# Patient Record
Sex: FEMALE | Race: NATIVE HAWAIIAN OR OTHER PACIFIC ISLANDER | Employment: UNEMPLOYED | URBAN - METROPOLITAN AREA
[De-identification: names, ages, dates, MRNs, and addresses within clinical notes are randomized per-mention and may not be internally consistent; named-entity substitution may affect disease eponyms.]

---

## 2021-06-04 ENCOUNTER — HOSPITAL ENCOUNTER (OUTPATIENT)
Dept: MAMMOGRAPHY | Age: 31
Discharge: HOME | End: 2021-06-04
Attending: CLINIC/CENTER
Payer: COMMERCIAL

## 2021-06-04 DIAGNOSIS — Z80.3 FAMILY HISTORY OF BREAST CANCER: ICD-10-CM

## 2021-06-04 DIAGNOSIS — Z71.1 FEARED COMPLAINT WITHOUT DIAGNOSIS: ICD-10-CM

## 2021-06-04 PROCEDURE — 77066 DX MAMMO INCL CAD BI: CPT

## 2021-06-04 PROCEDURE — 76642 ULTRASOUND BREAST LIMITED: CPT

## 2021-06-04 NOTE — PROGRESS NOTES
Notify of no evidence of breast cancer, implants are seen and appear intact, palpable area likely is the implant.   If worsening problems will refer to plastic surgery for additional evaluation of implant

## 2022-06-01 ENCOUNTER — OFFICE VISIT (OUTPATIENT)
Dept: ORTHOPEDIC SURGERY | Age: 32
End: 2022-06-01
Payer: COMMERCIAL

## 2022-06-01 VITALS — WEIGHT: 165 LBS | BODY MASS INDEX: 29.23 KG/M2 | HEIGHT: 63 IN

## 2022-06-01 DIAGNOSIS — M54.16 LUMBAR RADICULOPATHY: ICD-10-CM

## 2022-06-01 DIAGNOSIS — M43.16 SPONDYLOLISTHESIS OF LUMBAR REGION: Primary | ICD-10-CM

## 2022-06-01 PROCEDURE — 99203 OFFICE O/P NEW LOW 30 MIN: CPT | Performed by: PHYSICIAN ASSISTANT

## 2022-06-01 NOTE — LETTER
Danny Bob                                                     KENY DUBON  1990  MRN [id number]                                              ROOM NUMBER______      Radiographic Studies:    Cervical MRI      Thoracic MRI         Lumbar MRI          Pelvis MRI        CONTRAST    CT Myelogram: _______________   NCS/EMG ________________ ( UE  /  LE )     MRI of ___________________          Other: ____________________      Injections:    KNEE    HIP  Depomedrol _____ mg Euflexxa _____    _______________ TFESI/SNRB  _______________ SI Joint  _______________ SCOTT    _______________ Facet  _______________Piriformis/ Sciatica      Medications:    Oral Steroids _______________  NSAIDS _______________    Muscle Relaxers _______________  Neurontin/Lyrica _______________    Pain Medicine _______________  Other _______________                       Physical Therapy:    Lumbar     Thoracic      Cervical     Hip       Knee       Shoulder               Traction          Ultrasound          Dry Needling      Referral:    Pain referral:  [facility]   [facility]   Other: ______________________________    Follow-up/ Refer__________________________________________________    Authorization to hold blood thinners:___________________________________

## 2022-06-01 NOTE — PATIENT INSTRUCTIONS
Adult Spondylolisthesis in the Low Back    In spondylolisthesis, one of the bones in your spine  called a vertebra  slips forward and out of place. This may occur anywhere along the spine, but is most common in the lower back (lumbar spine). In some people, this causes no symptoms at all. Others may have back and leg pain that ranges from mild to severe. Understanding how your spine works can help you better understand spondylolisthesis. Types of Spondylolisthesis  Many types of spondylolisthesis can affect adults. The two most common types are degenerative and spondylolytic. There are other less common types of spondylolisthesis, such as slippage caused by a recent, severe fracture or a tumor. Degenerative Spondylolisthesis  As we age, general wear and tear causes changes in the spine. Intervertebral disks begin to dry out and weaken. They lose height, become stiff, and begin to bulge. This disk degeneration is the start to both arthritis and degenerative spondylolisthesis (DS). Degenerative spondylolisthesis  As arthritis develops, it weakens the joints and ligaments that hold your vertebrae in the proper position. The ligament along the back of your spine (ligamentum flavum) may begin to buckle. One of the vertebrae on either side of a worn, flattened disk can loosen and move forward over the vertebra below it. This slippage can narrow the spinal canal and put pressure on the spinal cord. This narrowing of the spinal canal is called spinal stenosis and is a common problem in patients with DS. Women are more likely than men to have DS, and it is more common in patients who are older than 50. A higher incidence has been noted in the -American population. In this x-ray taken from the side, vertebrae in the low back have slipped out of place due to degenerative spondylolisthesis.       Spondylolytic Spondylolisthesis  One of the bones in your lower back can break and this can cause a vertebra to slip forward. The break most often occurs in the area of your lumbar spine called the pars interarticularis. In most cases of spondylolytic spondylolisthesis, the pars fracture occurs during adolescence and goes unnoticed until adulthood. The normal disk degeneration that occurs in adulthood can then stress the pars fracture and cause the vertebra to slip forward. This type of spondylolisthesis is most often seen in middle-aged men. (Left) In spondylolysis, a fracture often occurs at the pars interarticularis. (Right) Because of the pars fracture, only the front part of the bone slips forward. Because a pars fracture causes the front (vertebra) and back (lamina) parts of the spinal bone to disconnect, only the front part slips forward. This means that narrowing of the spinal canal is less likely than in other kinds of spondylolisthesis, such as DS in which the entire spinal bone slips forward. This x-ray taken from the side shows a pars fracture (arrow) and the resulting spondylolisthesis. Symptoms  About 4% to 6% of the [place] population has spondylolysis and spondylolisthesis. Most of these people live with the condition for many years without any pain or other symptoms. Symptoms of Degenerative Spondylolisthesis  Patients with DS often visit the doctor's office once the slippage has begun to put pressure on the spinal nerves. Although the doctor may find arthritis in the spine, the symptoms of DS are typically the same as symptoms of spinal stenosis. For example, DS patients often develop leg and/or lower back pain. The most common symptoms in the legs include a feeling of vague weakness associated with prolonged standing or walking. Leg symptoms may be accompanied by numbness, tingling, and/or pain that is often affected by posture. Forward bending or sitting often relieves the symptoms because it opens up space in the spinal canal. Standing or walking often increases symptoms.     Symptoms of Spondylolytic Spondylolisthesis  Most patients with spondylolytic spondylolisthesis do not have pain and are often surprised to find they have the slippage when they see it in x-rays. They typically visit a doctor with low back pain related to activities. The back pain is sometimes accompanied by leg pain. To Top     Treatment    Nonsurgical Treatment  Although nonsurgical treatments will not repair the slippage, many patients report that these methods do help relieve symptoms. Physical therapy and exercise. Specific exercises can strengthen and stretch your lower back and abdominal muscles. Medication. Analgesics and non-steroidal anti-inflammatory medicines may relieve pain. Steroid injections. Cortisone is a powerful anti-inflammatory. Cortisone injections around the nerves or in the \"epidural space\" can decrease swelling, as well as pain. It is not recommended to receive these, however, more than three times per year. These injections are more likely to decrease pain and numbness, but not weakness of the legs. Surgical Treatment  Surgical candidates with DS. Surgery for degenerative spondylolisthesis is generally reserved for the patient who does not improve after a trial of nonsurgical treatment for at least 3 to 6 months. In making a decision about surgery, your doctor will also take into account the extent of arthritis in your spine, as well as whether your spine has excessive movement. DS patients who are candidates for surgery often are unable to walk or stand, and have a poor quality of life due to the pain and weakness. Surgical candidates with spondylolytic spondylolisthesis. Patients with symptoms that have not responded to nonsurgical treatment for at least 6 to12 months may be candidates for surgery.   If the slippage is getting worse or the patient has progressive neurologic symptoms, such as weakness, numbness, or falling, and/or symptoms of cauda equina syndrome, surgery may help.  Surgical procedures. Surgery for both DS and spondylolytic spondylolisthesis includes removing the pressure from the nerves and spinal fusion. Removing the pressure involves opening up the spinal canal. This procedure is called a laminectomy. Spinal fusion is essentially a \"welding\" process. The basic idea is to fuse together the painful vertebrae so that they heal into a single, solid bone. In spinal fusion, screws are often used to help stabilize the spine. Surgical recovery. The fusion process takes time. It may be several months before the bone is solid, although your comfort level will often improve much faster. Patient Education        Espondilólisis y espondilolistesis: Ejercicios  Spondylolysis and Spondylolisthesis: Exercises  Instrucciones de cuidado  Estos son algunos ejemplos de ejercicios típicos de rehabilitación para evans afección. Comience cada ejercicio lentamente. Reduzca la intensidad delejercicio si Rudolfo Italo a sentir dolor. Evans médico o el fisioterapeuta le dirán cuándo puede comenzar con estosejercicios y cuáles funcionarán mejor para usted. Cómo se hacen los ejercicios  Ejercicio de cindy rodilla al pecho    1. Acuéstese boca arriba con las rodillas flexionadas y los pies apoyados sobre el suelo. Puede ponerse un pequeño cojín debajo de la merissa y el uri si así se siente más cómodo. 2. Lleve cindy [place], manteniendo el otro pie sobre el suelo. 3. Mantenga la parte baja de la espalda presionada contra el suelo. Mantenga la posición entre 15 y 27 segundos. 4. Relájese y regrese la rodilla a la posición inicial.  5. Repita el ejercicio con la otra pierna. Repita de 2 a 4 veces con cada pierna. 6. Para lograr un mejor estiramiento, deje la otra pierna con el pie apoyado en el suelo mientras lleva la rodilla hacia el pecho. Ejercicio de ambas rodillas al pecho    1. Acuéstese boca arriba con las rodillas flexionadas y los pies apoyados en el suelo.  Puede ponerse un pequeño cojín debajo de la merissa y el uri si así se siente más cómodo. 2. Lleve ambas rodillas hacia el pecho. 3. Mantenga la parte baja de la espalda presionada contra el suelo. Mantenga la posición entre 15 y 27 segundos. 4. Relájese y regrese las rodillas a la posición inicial.  5. Repita de 2 a 4 veces. Ejercicio alternando cindy pierna y un brazo (dudley de caza)    Justen jerrod ejercicio lentamente. Trate de mantener el cuerpo recto todo eltiempo. 1. Comience en el suelo, apoyado [employer] y [place] Hence. 2. Tense los músculos del abdomen metiendo el ombligo hacia la columna vertebral. Asegúrese de seguir respirando normalmente y no contenga la respiración. 3. [place] un brazo del suelo y [place] estirado frente a usted. Tenga cuidado de no dejar que el hombro se caiga, pues esto hace girar el tronco.  4. Mantenga esta posición lacho unos 6 segundos y luego baje el brazo y Solo de brazo. 5. Repita de 8 a 12 veces con cada brazo. 6. Cuando pueda hacer jerrod ejercicio con facilidad y sin sentir dolor, repita los pasos del 1 al 5. Oumar esta vez hágalo con cindy pierna levantada del suelo, manteniéndola derecha detrás de usted. Tenga cuidado de no dejar que la cadera se caiga, pues esto hace girar el tronco.  7. Cuando le sea fácil mantener derecha la pierna, trate de levantar el brazo opuesto al mismo tiempo y repita los pasos del 1 al 5. Puenteo    1. Acuéstese de espalda con ambas rodillas flexionadas. Las rodillas deben estar flexionadas aproximadamente a 90 grados. 2. A continuación presione los pies contra el suelo, apriete las nalgas y [address] las caderas del suelo hasta que los hombros, las caderas y las rodillas estén todos en línea recta. 3. Mantenga la posición lcaho unos 6 segundos mientras sigue respirando normalmente, y luego baje lentamente la cadera hacia el suelo y descanse hasta 10 segundos. 4. Repita de 8 a 12 veces. Abdominales    1.  Acuéstese en el suelo eran [place] con las rodillas dobladas en ángulo de 90 grados. Los pies deben estar apoyados en el suelo, a unas 12 pulgadas (30 cm) de las nalgas. 2. Cruce los [place]. Si esto le causa molestias en el uri, pruebe a poner las cynthia detrás del uri (no de la merissa), con los codos abiertos. 3. Contraiga lentamente los músculos del abdomen y eleve los omóplatos del suelo. 4. Mantenga la merissa alineada con el cuerpo; no presione la [employer]. 5. Mantenga esta posición por 1 o 2 segundos y después baje lentamente de nuevo hacia el suelo. 6. Repita de 8 a 12 veces. Planchas sobre los codos    Justen jerrod ejercicio lentamente. Trate de mantener el cuerpo recto todo eltiempo y no deje que un lado de la cadera baje más que Bharati. 1. Acuéstese boca abajo, descansando la parte superior del cuerpo [employer]. 2. Tense los músculos del abdomen trayendo el ombligo hacia la columna vertebral.  3. Manteniendo las rodillas sobre el suelo, presione con los antebrazos para elevar la parte superior del cuerpo. 4. Mantenga la posición lacho unos 6 segundos, y luego baje el cuerpo hacia el suelo. Descanse hasta 10 segundos. 5. Repita de 8 a 12 veces. 6. Con el paso del tiempo, aumente gradualmente [employer] posición por 15 a 30 segundos cada vez.  7. Si le resulta fácil hacer jerrod ejercicio con las rodillas sobre el suelo, trate de hacerlo con las rodillas y las piernas estiradas, apoyando los dedos de los pies en el suelo. La atención de seguimiento es cindy parte clave de evans tratamiento y seguridad. Asegúrese de hacer y acudir a todas las citas, y llame a evans médico si está teniendo problemas. También es cindy buena idea saber los [place] de susexámenes y mantener cindy lista de los medicamentos que hitesh. ¿Dónde puede encontrar más información en inglés? Ines David a [email or web address] e ingrese a evans cuenta de MyChart.  Antonio Blake J718 en [facility] \"[employer] Information\" para más información (en inglés) sobre \"Espondilólisis y espondilolistesis: Ejercicios. \"     Si no tiene cindy cuenta, tyrell clic en el enlace \"Sign Up Now\". Revisado: 1 julio, 2021               Versión del contenido: 13.2  © 4456-7832 Healthwise, Incorporated. Las instrucciones de cuidado fueron adaptadas bajo licencia por [facility] CARE ([facility]). Si usted tiene Coral Dalton afección médica o sobre estas instrucciones, siempre pregunte a evans profesional de estefany. Healthwise, Incorporated niega toda garantía o responsabilidad por evans uso de esta información.

## 2022-06-01 NOTE — PROGRESS NOTES
Name: Nacho Whitehead  YOB: 1990  Gender: female  MRN: [id number]    CC: Back Pain (right leg pain to shin)       HPI: This is a 27y.o. year old female who reports 1 month history of lower back pain with right radicular leg pain. Pain radiates down the right lateral leg there is numbness and tingling in her leg feels tired. There was no particular injury. It initially was 10 out of 10 it has reduced to 6 out of 10. She feels that meloxicam has provided relief and she also had oral steroids. History was obtained by patient and the use of virtual . This patient  has not had lumbar surgery in the past.     Thus far, the patient has tried NSAIDS and oral steroids       AMB PAIN ASSESSMENT 6/1/2022   Location of Pain Back   Location Modifiers Right   Severity of Pain 2   Quality of Pain Sharp   Duration of Pain Persistent   Frequency of Pain Constant   Date Pain First Started 5/1/2022   Relieving Factors Nsaids; Rest;Heat;Ice              ROS/Meds/PSH/PMH/FH/SH: I personally reviewed the patient's collected intake data. Below are the pertinents:    No Known Allergies    No current outpatient medications on file. History reviewed. No pertinent surgical history. There is no problem list on file for this patient. Tobacco:  reports that she has never smoked. She has never used smokeless tobacco.  Alcohol:   Social History     Substance and Sexual Activity   Alcohol Use Never        Physical Exam:   BMI: Body mass index is 29.23 kg/m². GENERAL:  Adult in no acute distress, well developed, well nourished Patient is appropriately conversant  MSK:  Examination of the lumbar spine reveals no sagittal or coronal imbalance   There is mild tenderness to palpation along the spinous processes and paraspinal musculature. The patient ambulates with a normal gait. ROM of bilateral hip(s) reveals no irritability. NEURO:  Cranial nerves grossly intact. No motor deficits. Straight leg testing is negative bilateral  Sensory testing reveals intact sensation to light touch and in the distribution of the L3-S1 dermatomes bilaterally  Ankle jerk is negative for clonus    Reflexes   Right Left   Quadriceps (L4) 2 2   Achilles (S1) 2 2     Strength testing in the lower extremity reveals the following based on the 5 point grading scale:     HF (L2) H Ab (L5) KE (L3/4) ADF (L4) EHL (L5) A Ev (S1) APF (S1)   Right 5 5 5 5 5 5 5   Left 5 5 5 5 5 5 5     PSYCH:  Alert and oriented X 3. Appropriate affect. Intact judgment and insight. Radiographic Studies:     AP, lateral and spot views of the lumbar spine:      Narrative   Exam: XR SPINE LUMB MIN 4 V on 5/17/2022 3:23 PM       Clinical History: The Female patient is 27years old  presenting for right   sciatica, abnormal reflex.       Comparison:  None       Findings:  3 views were obtained.  Five lumbar-type vertebral bodies are   demonstrated.        There is slight anterolisthesis of L5 on S1. Vertebral body height and   intervertebral disc height are within normal limits. There is no significant   facet arthropathy. IUD is in the projects over the midline pelvis.           Impression       1. Slight grade 1 anterolisthesis of L5 on S1.       CPT code(s) 04127         I independently reviewed the x-rays of the lumbar spine from May 17, 2022. Possibly pars lysis through this area. There is an anterior listhesis of L5-S1 is x-rays were obtained in supine position. Assessment/Plan:      ICD-10-CM    1. Spondylolisthesis of lumbar region  M43.16 Ambulatory referral to Physical Therapy   2. Lumbar radiculopathy  M54.16 Ambulatory referral to Physical Therapy        Patient has lumbar radiculopathy likely L5 secondary to L5-S1 spondylolisthesis. We discussed the diagnosis of spondylolisthesis which is possibly a pars defect. The images that were obtained were also supine, I suspect this may slip even further upon standing. Currently the patient has experienced improvement of her radicular symptoms. She would like to participate in physical therapy however if her symptoms do not continue to improve, I would recommend standing x-rays and an MRI scan of the lumbar spine to evaluate severity of neurogenic compression.    - Physical Therapy was prescribed and will include stretching, strengthening and modalities to promote blood flow, flexibility and core strengthening.  - If the patient fails to respond to these efforts, I would recommend MRI of the lumbar spine for further evaluation. No orders of the defined types were placed in this encounter. Orders Placed This Encounter   Procedures    Ambulatory referral to Physical Therapy        4 This is a chronic illness/condition with exacerbation and progression      Return for after PT with KEYANNA. Sinai Santamaria PA-C  06/01/22      Elements of this note were created using speech recognition software. As such, errors of speech recognition may be present.

## 2022-06-06 NOTE — PLAN OF CARE
Gabbi Hernandez  : 1990  Primary: [facility]  Secondary:  [address] @ [address] [facility] [address]  Phone: [phone]  Fax: [phone] Plan Frequency: 2 sessions per week for 60 days    Plan of Care/Certification Expiration Date: 22 (Start date on 22)      PT Visit Info:    No data recorded    OUTPATIENT PHYSICAL THERAPY:OP NOTE TYPE: Initial Assessment 2022               Episode  Appt Desk         Treatment Diagnosis:  Pain in Right Hip (M25.551)  Generalized Muscle Weakness (M62.81)  Low Back Pain (M54.5) Spondylolisthesis, lumbar region [M43.16]    Medical/Referring Diagnosis:  Spondylolisthesis, lumbar region [M43.16]  Radiculopathy, lumbar region [M54.16]  Referring Physician:  Jnenifer Diez MD Orders:  PT Eval and Treat   Return MD Appt:  None scheduled  Date of Onset:  Onset Date: 22 (Chronic sciatic pain since 15yo, worsened 1 month ago)     Allergies:  Patient has no known allergies. Restrictions/Precautions:    No data recordedNo data recorded   Medications Last Reviewed:  2022     SUBJECTIVE   History of Injury/Illness (Reason for Referral):     Ms. Gabbi Hernandez has attended 1 physical therapy session including initial evaluation as of 2022. She presents with low back pain which radiates towards the posterior RLE, terminating below the knee. She reports her pain originates from when she gave birth to her her first child about 3 years ago. Her chronic pain has been mostly manageable but increased a month ago. The patient is unable to lift objects at work, sleep through the night, stand and sit for long durations, and run for exercise without aggravating her symptoms. Patient sugns and symptoms are alleviated with low energy movements and the prednisone which was prescribed to her.  Gabbi Hernandez presents with signs and symptoms of increased pain, decreased ROM, decreased strength, decreased functional tolerance. Awilda Gallegos will benefit from home exercise program, therapeutic and postural strengthening exercises, manual therapeutic techniques as appropriate to address Warren Mccollum's current condition. Patient Stated Goal(s):  Patient goals are to return to pain free mobility and high energy activities. Initial:     6/10 Post Session:     5/10  Past Medical History/Comorbidities:   Ms. Adrianne Sales  has no past medical history on file. Ms. Adrianne Sales  has no past surgical history on file. Social History/Living Environment:    Lives With: Spouse  Home Layout: One level     Prior Level of Function/Work/Activity:   Prior level of function: Patient reports being active and frequnetly ran for exercise before her symptoms worsened    Learning:   Does the patient/guardian have any barriers to learning?: No barriers  What is the preferred language of the patient/guardian?: Slovenian  Is an  required?: No  How does the patient/guardian prefer to learn new concepts?: Listening; Reading; Demonstration; Pictures/Videos     Fall Risk Scale:    Total Score: 0  Barlow Fall Risk: Low (0-24)     Dominant Side:  right handed        OBJECTIVE     ORTHOSTATIC/POSTURE OBSERVATION:   Date:   6/7/2022       SITTING RESTING POSTURE - mildly rounded shoulders in sitting     STANDING RESTING POSTURE -Patient presents with correct head and shoulder posture in standing, increased lumbar lordosis          FUNCTIONAL MOBILITY Date:   6/7/2022   Transfers Independent   Gait deviations Not assessed   Assistive device None   Stairs Independent   Bed mobility Independent     PALPATION/TONE/TISSUE TEXTURE: Date:   6/7/2022   SOFT TISSUE:   Lumbar extensors unremarkable   QL unremarkable   PAIVM:   Lumbar Mild hypomobility of lower lumbar vertebrae centrally     ROM Date:  6/7/2022   LUMBAR ROM (TESTED IN STANDING)    RIGHT LEFT   Flexion 8° --   Extension NT --   Lateral Flexion 23° 24°   Rotation NT NT   HIP ROM   Flexion 94°, PROM 125° 100°, PROM 122°   Extension NT NT       STRENGTH   Date:   6/7/2022     Right Left   Hip Abduction 4 4+   Hip Adduction NT NT   Hip IR 4 4   Hip ER 4+ 4+   Hip Flexion 4- 4   Knee Extension 4+ 4+   Knee Flexion 4 4     SPECIAL TESTS: Assessed @ Initial Visit      -90/90: Negative, patient reaches >80° BLE     -SI COMPRESSION TEST: Negative, tested in sidelying, B   -SI POST. GAPPING: Negative   -GILLET TEST: Negative, BLE   -DINORAH 4: Negative, B   -DEEP SQUAT: Positive, Mild radiculopathy reported at RLE         NEURAL TENSION TESTS   Date: 6/7/2022   SLR Negative, BLE   SLUMP Negative, BLE     -MYOTOMES Date: 6/7/2022     Right Left   L2 & L3   (Hip Flexors) 4/5 4/5   L3-L4  (Knee Extensors) 5/5 5/5     -REFLEXES: Date: 6/7/2022     Right Left   L4  (Quadriceps) NT NT   S1  (Achilles) NT NT     RED FLAGS: Date: 6/7/2022   Non-Mechanical pain distribution (cannot be produced, changed, or reduced during exam): NO   Cauda Equina Dysfunction: NO   Upper lumbar disc herniation in younger patients (femoral nerve tension test for lateral disc herniation in lower lumbar): NO   Lumbar compression fracture (age > 48, trauma, corticosteroid use NO   Spine Cancer (age > 48, pervious history of cancer, failure to improve in 1 month of therapy, no relief - be rest, duration > 1 month, unexplained weight loss, insidious onset, constitutional symptoms): NO   Ankylosing Spondylitis (age < 36, pain not relieved by supine, morning back stiffness, pain duration > 3 months, improved by exercise): NO   Sacral Fracture: NO          ASSESSMENT   Initial Assessment:     Ms. Petra Fountain has attended 1 physical therapy session including initial evaluation as of 6/7/2022. She presents with low back pain which radiates towards the posterior RLE, terminating below the knee. She reports her pain originates from when she gave birth to her her first child about 3 years ago.  Her chronic pain has been mostly manageable but increased a month ago. The patient is unable to lift objects at work, sleep through the night, stand and sit for long durations, and run for exercise without aggravating her symptoms. Patient sugns and symptoms are alleviated with low energy movements and the prednisone which was prescribed to her. Candida Cervantes presents with signs and symptoms of increased pain, decreased ROM, decreased strength, decreased functional tolerance. Candida Cervantes will benefit from home exercise program, therapeutic and postural strengthening exercises, manual therapeutic techniques as appropriate to address Warren Mccollum's current condition. Candida Cervantes will benefit from skilled PT (medically necessary) to address above deficits affecting participation in basic ADLs and overall functional tolerance. Problem List: (Impacting functional limitations): Body Structures, Functions, Activity Limitations Requiring Skilled Therapeutic Intervention: Decreased functional mobility ; Decreased ROM; Decreased body mechanics; Decreased tolerance to work activity; Decreased strength; Decreased endurance; Increased pain; Decreased posture     Therapy Prognosis:   Therapy Prognosis: Good     Assessment Complexity:   Low Complexity  PLAN   Effective Dates: 6/7/2022   TO Plan of Care/Certification Expiration Date: 08/06/22 (Start date on 6/7/22)     Frequency/Duration: Plan Frequency: 2 sessions per week for 60 days     Interventions Planned (Treatment may consist of any combination of the following):    Current Treatment Recommendations: Strengthening; ROM; Balance training; Functional mobility training; Endurance training; Gait training; Neuromuscular re-education; Manual Therapy - Soft Tissue Mobilization; Manual Therapy - Joint Manipulation; Pain management; Home exercise program; Modalities; Integrated dry needling;  Therapeutic activities     GOALS: (Goals have been discussed and agreed upon with patient.)  Short Term Goals 4 weeks 1. Jaky Salguero will be independent with HEP to promote self-management of symptoms. 2. Jaky Salguero will participate in LE strengthening program with weights as appropriate to help with gait and elevations. 3. Jaky Salguero will participate in static and dynamic balance activities to decrease the risk for falls and improve overall QOL. [address] [place] [address] Emiliano will tolerate manual therapy/joint mobilizations/soft tissue to increase ROM and decrease pain to improve functional mobility during ADLs. Discharge Goals 12 weeks  1. Jaky Salguero will demonstrate a 5 point improvement on the Modified Oswestry Low Back Pain Disability to show improvement in function. 2. Jaky Copes will demonstrate >=4+/5 LE strength on manual muscle testing to improve functional mobility. 3. Jaky Salguero will be able to lift 20 lbs. from the ground 10x with minimal complaints of pain to demonstrate return to prior level of function   4. Jaky Salguero will be able to demonstrate safe lifting and transfer mechanics without cueing for improved safety with home, childcare, and community activities. 5. Jaky Salguero will report being able to run for 15 minutes to demonstrate ability to perform high energy activities. Outcome Measure: Tool Used: Modified Oswestry Low Back Pain Questionnaire  Score:  Initial: 7/50  Most Recent: X/50 (Date: -- )   Interpretation of Score: Each section is scored on a 0-5 scale, 5 representing the greatest disability. The scores of each section are added together for a total score of 50. Medical Necessity:   Patient is expected to demonstrate progress in strength, range of motion, balance, coordination and functional technique to return to prior level of function. Skilled intervention continues to be required due to decreased ROM, muscle weakness, decreased functional mobility, impaired gait, decreased posture, and increased pain.     Reason for Services/other comments:  Patient continues to require skilled intervention due to above deficits affecting participation in basic ADLs and overall functional tolerance. Total Duration:  Time In: 0900  Time Out: 1000    Regarding Warren Mccollum's therapy, I certify that the treatment plan above will be carried out by a therapist or under their direction.   Thank you for this referral,  Jessica Montalvo PT     Referring Physician Signature: Randall Overton PA-C _______________________________ Date _____________        Post Session Pain  Charge Capture   POC Link  Treatment Note Link  MD Guidelines  MyChart

## 2022-06-07 ENCOUNTER — HOSPITAL ENCOUNTER (OUTPATIENT)
Dept: PHYSICAL THERAPY | Age: 32
Setting detail: RECURRING SERIES
Discharge: HOME | End: 2022-06-10
Payer: COMMERCIAL

## 2022-06-07 PROCEDURE — 97110 THERAPEUTIC EXERCISES: CPT

## 2022-06-07 PROCEDURE — 97161 PT EVAL LOW COMPLEX 20 MIN: CPT

## 2022-06-07 ASSESSMENT — PAIN SCALES - GENERAL: PAINLEVEL_OUTOF10: 6

## 2022-06-07 NOTE — PROGRESS NOTES
Gabbi Hernandez  : 1990  Primary: [facility]  Secondary:  [address] @ [address]  [address]  Phone: [phone]  Fax: [phone] Plan Frequency: 2 sessions per week for 60 days    Plan of Care/Certification Expiration Date: 22 (Start date on 22)      PT Visit Info:   No data recorded    OUTPATIENT PHYSICAL THERAPY:OP NOTE TYPE: Treatment Note 2022       Episode  }Appt Desk              Treatment Diagnosis:  Pain in Right Hip (M25.551)  Generalized Muscle Weakness (M62.81)  Low Back Pain (M54.5) Spondylolisthesis, lumbar region [M43.16]  Medical/Referring Diagnosis:  Spondylolisthesis, lumbar region [M43.16]  Radiculopathy, lumbar region [M54.16]  Referring Physician:  Jennifer Diez MD Orders:  PT Eval and Treat   Date of Onset:  Onset Date: 22 (Chronic sciatic pain since 15yo, worsened 1 month ago)     Allergies:   Patient has no known allergies. Restrictions/Precautions:  No data recordedNo data recorded   Interventions Planned (Treatment may consist of any combination of the following):    Current Treatment Recommendations: Strengthening; ROM; Balance training; Functional mobility training; Endurance training; Gait training; Neuromuscular re-education; Manual Therapy - Soft Tissue Mobilization; Manual Therapy - Joint Manipulation; Pain management; Home exercise program; Modalities; Integrated dry needling; Therapeutic activities     Subjective Comments: Patient reports feeling low back pain which radiates down her L leg. Initial:}    6/10Post Session:       5/10  Medications Last Reviewed:  2022  Updated Objective Findings:  See evaluation note from today  Treatment     THERAPEUTIC EXERCISE: (20 minutes):  Exercises per grid below to improve mobility and strength.   Required moderate visual, verbal, manual and tactile cues to promote proper body alignment, promote proper body posture and promote proper body mechanics. Progressed resistance, range, repetitions and complexity of movement as indicated. Date:  6/7/2022   Activity/Exercise Parameters   Knees to chest 3x10, 3 sec hold, each side   Piriformis stretch 2x30 sec hold, B, supine   Core Transverse activation: 3x10, 3 sec hold, hooklying   Education 5 min; Home exercises, treatement, plan of care, prognosis, and pain modulation   [employer]     MANUAL THERAPY: (0 minutes): Joint mobilization, Soft tissue mobilization and Manipulation was utilized and necessary because of the patient's restricted joint motion, painful spasm, loss of articular motion and restricted motion of soft tissue. Commonly used abbreviations that may be included in this note:  STM- Soft tissue mobilization, R>L or L>R- right greater than left or vice versa, HEP - Home exercise program, CPA/UPA - Central or unilateral posterior-anterior mobilization, SLS- single leg stance, SKTC - Single leg to chest, SNAGS/NAGS- (sustained) Natural apophyseal glides, TKE- Terminal knee extension, ER- External rotation, IR- Internal rotation, B - Bilateral, sec- seconds, Lb- pounds, min - minutes, HA- Headache, OP- Over pressure, tband- theraband, fwd/bwd- Forward/Backward, TA- Transversus Abdominus, dbl- Double      TREATMENT/SESSION SUMMARY:     Response to Treatment:  See evaluation  · Communication/Consultation:  Home exercises, treatement, plan of care, prognosis, and pain modulation  · Equipment provided today:  None  · Recommendations/Intent for next treatment session: Next visit will focus on progressing strength and ROM.      Total Treatment Billable Duration:  55 minutes: 35 minutes low complexity evaluation, 20 minutes therapeutic exercise  Time In: 0900  Time Out: 1000    Stephy Leggett, PT         Charge Capture  }Post Session Pain  [facility] Portal  MD Guidelines  Scanned Media  Benefits  MyChart    Future Appointments   Date Time Provider Bandar Leigh 6/23/2022  3:30 PM Wayna Em, PTA [facility]   6/24/2022  2:30 PM Bhavani Chalino, PT [facility]   6/27/2022  3:30 PM Bhavani Chalino, PT [facility]   6/29/2022  3:30 PM Bhavani Chalino, PT [facility]   7/5/2022  3:30 PM Bhavani Chalino, PT [facility]   7/7/2022  3:30 PM Bhavani Chalino, PT [facility]   7/12/2022  3:30 PM Bhavani Chalino, PT [facility]   7/14/2022  3:30 PM Bhavani Chalino, PT [facility]   7/19/2022  3:30 PM Bhavani Chalino, PT [facility]   7/21/2022  3:30 PM Bhavani Chalino, PT [facility]   7/26/2022  3:30 PM Bhavani Chalino, PT [facility]   7/28/2022  3:30 PM Bhavani Chalino, PT [facility]   8/2/2022  3:30 PM Bhavani Chalino, PT [facility]   8/4/2022  3:30 PM Bhavani Chalino, PT [facility]   8/9/2022  3:30 PM Bhavani Chalino, PT [facility]   8/11/2022  3:30 PM Bhavani Chalino, PT [facility]

## 2022-06-09 NOTE — PROGRESS NOTES
Physical Therapy  [address] [place] [address],[address] at [facility] 6/7/2022. Patient opted to begin therapy when therapist returns from time off.      Bhavani Allred, PT, DPT, CSCS  6/7/2022

## 2022-06-23 ENCOUNTER — HOSPITAL ENCOUNTER (OUTPATIENT)
Dept: PHYSICAL THERAPY | Age: 32
Setting detail: RECURRING SERIES
Discharge: HOME | End: 2022-06-26
Payer: COMMERCIAL

## 2022-06-23 PROCEDURE — 97110 THERAPEUTIC EXERCISES: CPT

## 2022-06-23 ASSESSMENT — PAIN SCALES - GENERAL: PAINLEVEL_OUTOF10: 0

## 2022-06-23 NOTE — PROGRESS NOTES
Jaky Salguero  : 1990  Primary: [facility]  Secondary:  [address]  [address]  Phone: [phone]  Fax: [phone] Plan Frequency: 2 sessions per week for 60 days    Plan of Care/Certification Expiration Date: 22 (Start date on 22)      PT Visit Info:   No data recorded    OUTPATIENT PHYSICAL THERAPY:OP NOTE TYPE: Treatment Note 2022       Episode  }Appt Desk              Treatment Diagnosis:  Pain in Right Hip (M25.551)  Generalized Muscle Weakness (M62.81)  Low Back Pain (M54.5) Spondylolisthesis, lumbar region [M43.16]  Medical/Referring Diagnosis:  Spondylolisthesis, lumbar region [M43.16]  Radiculopathy, lumbar region [M54.16]  Referring Physician:  Destiny Villalpando MD Orders:  PT Eval and Treat   Date of Onset:  Onset Date: 22 (Chronic sciatic pain since 13yo, worsened 1 month ago)     Allergies:   Patient has no known allergies. Restrictions/Precautions:  No data recordedNo data recorded   Interventions Planned (Treatment may consist of any combination of the following):    Current Treatment Recommendations: Strengthening; ROM; Balance training; Functional mobility training; Endurance training; Gait training; Neuromuscular re-education; Manual Therapy - Soft Tissue Mobilization; Manual Therapy - Joint Manipulation; Pain management; Home exercise program; Modalities; Integrated dry needling; Therapeutic activities     Subjective Comments:. Patient states she feels much better. reports no pain today. . Reports intermittent pain with some job duties and ADL'S  Initial:}    0/10Post Session:       0/10  Medications Last Reviewed:  2022  Updated Objective Findings:  No pain today  Treatment     THERAPEUTIC EXERCISE: (40 minutes):  Exercises per grid below to improve mobility and strength.   Required moderate visual, verbal, manual and tactile cues to promote proper body alignment, promote proper body posture and promote proper body mechanics. Progressed resistance, range, repetitions and complexity of movement as indicated. Date:  6/7/2022 Date:  6/23/22     Activity/Exercise Parameters      Knees to chest 3x10, 3 sec hold, each side 10 x 5 sec     Piriformis stretch 2x30 sec hold, B, supine 10 x 10 sec knee in and out     Core Transverse activation: 3x10, 3 sec hold, hooklying TA multiple reps   TA with marching 2 x 10 each     Education 5 min; Home exercises, treatement, plan of care, prognosis, and pain modulation 5 minutes body mechanics and core control     Hamstring stretch  Strap 5 x 20 sec     Trunk rotation  3 x 10            [facility] Portal     MANUAL THERAPY: (0 minutes): Joint mobilization, Soft tissue mobilization and Manipulation was utilized and necessary because of the patient's restricted joint motion, painful spasm, loss of articular motion and restricted motion of soft tissue. Commonly used abbreviations that may be included in this note:  STM- Soft tissue mobilization, R>L or L>R- right greater than left or vice versa, HEP - Home exercise program, CPA/UPA - Central or unilateral posterior-anterior mobilization, SLS- single leg stance, SKTC - Single leg to chest, SNAGS/NAGS- (sustained) Natural apophyseal glides, TKE- Terminal knee extension, ER- External rotation, IR- Internal rotation, B - Bilateral, sec- seconds, Lb- pounds, min - minutes, HA- Headache, OP- Over pressure, tband- theraband, fwd/bwd- Forward/Backward, TA- Transversus Abdominus, dbl- Double      TREATMENT/SESSION SUMMARY:     Response to Treatment:  Demonstrated good understanding of HEP and core stabilization with activities  · Communication/Consultation:  HEP. Body mechanics for ADL'S and work  · Equipment provided today:  None  · Recommendations/Intent for next treatment session: Next visit will focus on progressing strength and ROM.      Total Treatment Billable Duration:  40 minutes:   Time In: 1332  Time Out: [address] [facility]         Charge Capture  }Post Session Pain  [facility] Portal  MD Guidelines  Scanned Media  Benefits  MyChart    Future Appointments   Date Time Provider Bandar Leigh   6/24/2022  2:30 PM Arsenio Eden, PT [facility]   6/27/2022  3:30 PM Arsenio Eden, PT [facility]   6/29/2022  3:30 PM Arsenio Eden, PT [facility]   7/5/2022  3:30 PM Arsenio Eden, PT [facility]   7/7/2022  3:30 PM Arsenio Eden, PT [facility]   7/12/2022  3:30 PM Arsenio Eden, PT [facility]   7/14/2022  3:30 PM Arsenio Eden, PT [facility]   7/19/2022  3:30 PM Arsenio Eden, PT [facility]   7/21/2022  3:30 PM Arsenio Eden, PT [facility]   7/26/2022  3:30 PM Arsenio Eden, PT [facility]   7/28/2022  3:30 PM Arsenio Eden, PT [facility]   8/2/2022  3:30 PM Arsenio Eden, PT [facility]   8/4/2022  3:30 PM Arsenio Eden, PT [facility]   8/9/2022  3:30 PM Arsenio Eden, PT [facility]   8/11/2022  3:30 PM Arsenio Eden, PT [facility]

## 2022-06-24 ENCOUNTER — HOSPITAL ENCOUNTER (OUTPATIENT)
Dept: PHYSICAL THERAPY | Age: 32
Setting detail: RECURRING SERIES
Discharge: HOME | End: 2022-06-27
Payer: COMMERCIAL

## 2022-06-24 PROCEDURE — 97110 THERAPEUTIC EXERCISES: CPT

## 2022-06-24 PROCEDURE — 97140 MANUAL THERAPY 1/> REGIONS: CPT

## 2022-06-24 ASSESSMENT — PAIN SCALES - GENERAL: PAINLEVEL_OUTOF10: 0

## 2022-06-24 NOTE — PROGRESS NOTES
Gumaro Cox  : 1990  Primary: [facility]  Secondary:  [address] [facility] [address]  Phone: [phone]  Fax: [phone] Plan Frequency: 2 sessions per week for 60 days    Plan of Care/Certification Expiration Date: 22 (Start date on 22)      PT Visit Info:   No data recorded    OUTPATIENT PHYSICAL THERAPY:OP NOTE TYPE: Treatment Note 2022       Episode  }Appt Desk              Treatment Diagnosis:  Pain in Right Hip (M25.551)  Generalized Muscle Weakness (M62.81)  Low Back Pain (M54.5) Spondylolisthesis, lumbar region [M43.16]  Medical/Referring Diagnosis:  Spondylolisthesis, lumbar region [M43.16]  Radiculopathy, lumbar region [M54.16]  Referring Physician:  Ledy Pedraza MD Orders:  PT Eval and Treat   Date of Onset:  Onset Date: 22 (Chronic sciatic pain since 15yo, worsened 1 month ago)     Allergies:   Patient has no known allergies. Restrictions/Precautions:  No data recordedNo data recorded   Interventions Planned (Treatment may consist of any combination of the following):    Current Treatment Recommendations: Strengthening; ROM; Balance training; Functional mobility training; Endurance training; Gait training; Neuromuscular re-education; Manual Therapy - Soft Tissue Mobilization; Manual Therapy - Joint Manipulation; Pain management; Home exercise program; Modalities; Integrated dry needling; Therapeutic activities     Subjective Comments: Patient arrives to therapy without low back pain but felt pain at work when she picked up heavy trays. Initial:}    0/10Post Session:       0/10  Medications Last Reviewed:  2022  Updated Objective Findings:  Increased anterior pelvic tilt; negative tip test, B  Treatment     THERAPEUTIC EXERCISE: (50 minutes):  Exercises per grid below to improve mobility and strength.   Required moderate visual, verbal, manual and tactile cues to promote proper body alignment, promote proper body posture and promote proper body mechanics. Progressed resistance, range, repetitions and complexity of movement as indicated. Date:  6/7/2022 Date:  6/23/22 Date:  6/24/22   Activity/Exercise Parameters  Parameters   NuFit   5 min, level 2   Knees to chest 3x10, 3 sec hold, each side 10 x 5 sec ---   Piriformis stretch 2x30 sec hold, B, supine 10 x 10 sec knee in and out 2x30 sec, each, B, supine   Core Transverse activation: 3x10, 3 sec hold, hooklying TA multiple reps   TA with marching 2 x 10 each Deadbugs: 3x10, each side, physioball held between hands and knees   Education 5 min; Home exercises, treatement, plan of care, prognosis, and pain modulation 5 minutes body mechanics and core control 5 min: lifting mechanics, utilizing LE with core control   Hamstring stretch  Strap 5 x 20 sec ---   Trunk rotation  3 x 10 3x30 sec, each side, in sitting   Hip Adduction   3x10, 2 sec hold, cueing for TA activation   Hip Extension   3x15, modified plantigrade, red theraband around ankles   Lunges   3x10, each   Goblet Squat   3x10, 10#   MedBridge Portal     MANUAL THERAPY: (10 minutes): Joint mobilization, Soft tissue mobilization and Manipulation was utilized and necessary because of the patient's restricted joint motion, painful spasm, loss of articular motion and restricted motion of soft tissue. · Soft tissue mobilization of lumbar paraspinals and R gluteal region including R TFL, medium pressure  · Grade 2-3 joint mobilizations of lumbar and sacrum centrally.     Commonly used abbreviations that may be included in this note:  STM- Soft tissue mobilization, R>L or L>R- right greater than left or vice versa, HEP - Home exercise program, CPA/UPA - Central or unilateral posterior-anterior mobilization, SLS- single leg stance, SKTC - Single leg to chest, SNAGS/NAGS- (sustained) Natural apophyseal glides, TKE- Terminal knee extension, ER- External rotation, IR- Internal rotation, B - Bilateral, sec- seconds, Lb- pounds, min - minutes, HA- Headache, OP- Over pressure, tband- theraband, fwd/bwd- Forward/Backward, TA- Transversus Abdominus, dbl- Double      TREATMENT/SESSION SUMMARY:     · Response to Treatment:  Patient demonstrates adequate flexibility in hip flexion, hamstring flexibility, and is negative B tip test.  · Communication/Consultation:  HEP. Body mechanics for ADL'S and work  · Equipment provided today:  None  · Recommendations/Intent for next treatment session: Next visit will focus on progressing strength and ROM.      Total Treatment Billable Duration:  60 minutes:   Time In: 1430  Time Out: 629 [facility], PT         Charge Capture  }Post Session Pain  MedBridge Portal  MD Guidelines  Scanned Media  Benefits  MyChart    Future Appointments   Date Time Provider Bandar Leigh   6/27/2022  3:30 PM Sri Shah, PT [facility]   6/29/2022  3:30 PM Sri Shah, PT [facility]   7/5/2022  3:30 PM Sri Shah, PT [facility]   7/7/2022  3:30 PM Sri Shah, PT [facility]   7/12/2022  3:30 PM Sri Shah, PT [facility]   7/14/2022  3:30 PM Sri Shah, PT [facility]   7/19/2022  3:30 PM Sri Shah, PT [facility]   7/21/2022  3:30 PM Sri Shah, PT [facility]   7/26/2022  3:30 PM Sri Shah, PT [facility]   7/28/2022  3:30 PM Sri Shah, PT [facility]   8/2/2022  3:30 PM Sri Shah, PT [facility]   8/4/2022  3:30 PM Sri Shah, PT [facility]   8/9/2022  3:30 PM Sri Shah, PT [facility]   8/11/2022  3:30 PM Sri Shah, PT [facility]

## 2022-06-29 ENCOUNTER — HOSPITAL ENCOUNTER (OUTPATIENT)
Dept: PHYSICAL THERAPY | Age: 32
Setting detail: RECURRING SERIES
Discharge: HOME | End: 2022-07-02
Payer: COMMERCIAL

## 2022-06-29 PROCEDURE — 97110 THERAPEUTIC EXERCISES: CPT

## 2022-06-29 ASSESSMENT — PAIN SCALES - GENERAL: PAINLEVEL_OUTOF10: 0

## 2022-06-29 NOTE — PROGRESS NOTES
Vanessa Tirado  : 1990  Primary: [facility]  Secondary:  [address] [facility] [address]  Phone: [phone]  Fax: [phone] Plan Frequency: 2 sessions per week for 60 days    Plan of Care/Certification Expiration Date: 22 (Start date on 22)      PT Visit Info:   No data recorded    OUTPATIENT PHYSICAL THERAPY:OP NOTE TYPE: Treatment Note 2022       Episode  }Appt Desk              Treatment Diagnosis:  Pain in Right Hip (M25.551)  Generalized Muscle Weakness (M62.81)  Low Back Pain (M54.5) Spondylolisthesis, lumbar region [M43.16]  Medical/Referring Diagnosis:  Spondylolisthesis, lumbar region [M43.16]  Radiculopathy, lumbar region [M54.16]  Referring Physician:  Brianna Jacobs MD Orders:  PT Eval and Treat   Date of Onset:  Onset Date: 22 (Chronic sciatic pain since 13yo, worsened 1 month ago)     Allergies:   Patient has no known allergies. Restrictions/Precautions:  No data recordedNo data recorded   Interventions Planned (Treatment may consist of any combination of the following):    Current Treatment Recommendations: Strengthening; ROM; Balance training; Functional mobility training; Endurance training; Gait training; Neuromuscular re-education; Manual Therapy - Soft Tissue Mobilization; Manual Therapy - Joint Manipulation; Pain management; Home exercise program; Modalities; Integrated dry needling; Therapeutic activities     Subjective Comments: Patient reports feeling better. She states she is utilizing her core muscles more frequently when lifting and squatting and has noticed improvements. Initial:}    0/10Post Session:       0/10  Medications Last Reviewed:  2022  Updated Objective Findings:  None Today  Treatment     THERAPEUTIC EXERCISE: (55 minutes):  Exercises per grid below to improve mobility and strength.   Required moderate visual, verbal, manual and tactile cues to promote proper body alignment, promote proper body posture and promote proper body mechanics. Progressed resistance, range, repetitions and complexity of movement as indicated. Date:  6/7/2022 Date:  6/23/22 Date:  6/24/22 Date:  6/29/22   Activity/Exercise Parameters Parameters Parameters Parameters   NuFit   5 min, level 2 5 min, level 2   Knees to chest 3x10, 3 sec hold, each side 10 x 5 sec ---    Piriformis stretch 2x30 sec hold, B, supine 10 x 10 sec knee in and out 2x30 sec, each, B, supine ---   Core Transverse activation: 3x10, 3 sec hold, hooklying TA multiple reps   TA with marching 2 x 10 each Deadbugs: 3x10, each side, physioball held between hands and knees Birddog: 3x10   Education 5 min; Home exercises, treatement, plan of care, prognosis, and pain modulation 5 minutes body mechanics and core control 5 min: lifting mechanics, utilizing LE with core control 5 min: Home exercises   Hamstring stretch  Strap 5 x 20 sec --- ---   Trunk rotation  3 x 10 3x30 sec, each side, in sitting Book openers:  1x20 , each side    Hip mobility    Hip openers, 3x10, each side   Hip Abduction    2x20, standing    Hip Adduction   3x10, 2 sec hold, cueing for TA activation ---   Hip Extension   3x15, modified plantigrade, red theraband around ankles ---   Lunges   3x10, each Reverse: 3x10 each   Goblet Squat   3x10, 10# 2x10, 10#  1x10 15#   MedBridge Portal     MANUAL THERAPY: (5 minutes): Joint mobilization, Soft tissue mobilization and Manipulation was utilized and necessary because of the patient's restricted joint motion, painful spasm, loss of articular motion and restricted motion of soft tissue. NOT BILLED    · Soft tissue mobilization of lumbar paraspinals and R gluteal region including R TFL, medium pressure  · Grade 2-3 joint mobilizations of lumbar and sacrum centrally.     Commonly used abbreviations that may be included in this note:  STM- Soft tissue mobilization, R>L or L>R- right greater than left or vice versa, HEP - Home exercise program, CPA/UPA - Central or unilateral posterior-anterior mobilization, SLS- single leg stance, SKTC - Single leg to chest, SNAGS/NAGS- (sustained) Natural apophyseal glides, TKE- Terminal knee extension, ER- External rotation, IR- Internal rotation, B - Bilateral, sec- seconds, Lb- pounds, min - minutes, HA- Headache, OP- Over pressure, tband- theraband, fwd/bwd- Forward/Backward, TA- Transversus Abdominus, dbl- Double      TREATMENT/SESSION SUMMARY:     · Response to Treatment:  Patient complaints of mild L hip pain to palpation but denies any radiating pain. She presents with core weakness on the L side of Birdogs. She demonstrates improvement in LE strength and progresses to more resistance with squats. The patient denies any aggravation of symptoms and states she is feeling better  · Communication/Consultation:  HEP. Body mechanics for ADL'S and work  · Equipment provided today:  None  · Recommendations/Intent for next treatment session: Next visit will focus on progressing strength, functional mobility, pain tolerance, and ROM as tolerated. May include lateral plane exercises as tolerated.    Total Treatment Billable Duration:  60 minutes:   Time In: 5933  Time Out: 91 [facility],          Charge Capture  }Post Session Pain  MedBridge Portal  MD Guidelines  Scanned Media  Benefits  MyChart    Future Appointments   Date Time Provider Bandar Leigh   7/5/2022  3:30 PM Anna Macias, PT [facility] [place]   7/7/2022  3:30 PM Anna Macias, PT [facility]   7/12/2022  3:30 PM Anna Macias, PT [facility]   7/14/2022  3:30 PM Anna Macias, PT [facility]   7/19/2022  3:30 PM Anna Macias, PT [facility]   7/21/2022  3:30 PM Anna Macias, PT [facility]   7/26/2022  3:30 PM Anna Macias, PT [facility]   7/28/2022  3:30 PM Anna Macias, PT [facility]   8/2/2022  3:30 PM Anna Macias, PT [facility]   8/4/2022  3:30 PM Anna Macias, PT [facility] [place]   8/9/2022 3:30 PM Henry Irwin, PT [facility]   8/11/2022  3:30 PM [facility]

## 2022-07-05 ENCOUNTER — HOSPITAL ENCOUNTER (OUTPATIENT)
Dept: PHYSICAL THERAPY | Age: 32
Setting detail: RECURRING SERIES
Discharge: HOME | End: 2022-07-08
Payer: COMMERCIAL

## 2022-07-05 PROCEDURE — 97110 THERAPEUTIC EXERCISES: CPT

## 2022-07-05 NOTE — PROGRESS NOTES
Dixon Collins  : 1990  Primary: [facility]  Secondary:  [address] @ [facility] [facility] [address]  Phone: [phone]  Fax: [phone] Plan Frequency: 2 sessions per week for 60 days    Plan of Care/Certification Expiration Date: 22 (Start date on 22)      PT Visit Info:   No data recorded    OUTPATIENT PHYSICAL THERAPY:OP NOTE TYPE: Treatment Note 2022       Episode  }Appt Desk              Treatment Diagnosis:  Pain in Right Hip (M25.551)  Generalized Muscle Weakness (M62.81)  Low Back Pain (M54.5) Spondylolisthesis, lumbar region [M43.16]  Medical/Referring Diagnosis:  Spondylolisthesis, lumbar region [M43.16]  Radiculopathy, lumbar region [M54.16]  Referring Physician:  Sally Tejada MD Orders:  PT Eval and Treat   Date of Onset:  Onset Date: 22 (Chronic sciatic pain since 15yo, worsened 1 month ago)     Allergies:   Patient has no known allergies. Restrictions/Precautions:  No data recordedNo data recorded   Interventions Planned (Treatment may consist of any combination of the following):    Current Treatment Recommendations: Strengthening; ROM; Balance training; Functional mobility training; Endurance training; Gait training; Neuromuscular re-education; Manual Therapy - Soft Tissue Mobilization; Manual Therapy - Joint Manipulation; Pain management; Home exercise program; Modalities; Integrated dry needling; Therapeutic activities     Subjective Comments: Patient reports to therapy with . She states her symptoms have improved overall and she denies any pain. Initial:}    0/10Post Session:       0/10  Medications Last Reviewed:  2022  Updated Objective Findings:  None Today  Treatment     THERAPEUTIC EXERCISE: (55 minutes):  Exercises per grid below to improve mobility and strength.   Required moderate visual, verbal, manual and tactile cues to promote proper body alignment, promote proper body posture and promote proper body mechanics. Progressed resistance, range, repetitions and complexity of movement as indicated. Date:  6/24/22 Date:  6/29/22 Date:  7/5/22   Activity/Exercise Parameters Parameters Parameters   NuFit 5 min, level 2 5 min, level 2 5 min, level 3   Knees to chest --- --- ---   Piriformis stretch 2x30 sec, each, B, supine --- ---   Core Deadbugs: 3x10, each side, physioball held between hands and knees Birddog: 3x10 · Pallof Press: 3x15, each side    · Physioball rotations: 3x10, each side    · Dead Bug: 3x10 each side     Education 5 min: lifting mechanics, utilizing LE with core control 5 min: Home exercises ---   Trunk rotation 3x30 sec, each side, in sitting Book openers:  1x20 , each side  Next session   Hip mobility  Hip openers, 3x10, each side Next session   Hip Abduction  2x20, standing  ---   Hip Adduction 3x10, 2 sec hold, cueing for TA activation --- ---   Hip Extension 3x15, modified plantigrade, red theraband around ankles --- ---   Lunges 3x10, each Reverse: 3x10 each Side Lunge: 2x15 each side   Side Steps   3x20' each side, blue theraband around knees   Goblet Squat 3x10, 10# 2x10, 10#  1x10 15# 2x15, 15#, on slant board to optimize knee ROM   [facility] Portal     MANUAL THERAPY: (4 minutes): Joint mobilization, Soft tissue mobilization and Manipulation was utilized and necessary because of the patient's restricted joint motion, painful spasm, loss of articular motion and restricted motion of soft tissue.  NOT BILLED    · Soft tissue mobilization of lumbar paraspinals and R gluteal region including R TFL, soft to medium pressure    Commonly used abbreviations that may be included in this note:  STM- Soft tissue mobilization, R>L or L>R- right greater than left or vice versa, HEP - Home exercise program, CPA/UPA - Central or unilateral posterior-anterior mobilization, SLS- single leg stance, SKTC - Single leg to chest, SNAGS/NAGS- (sustained) Natural apophyseal elisabeth, TKE- Terminal knee extension, ER- External rotation, IR- Internal rotation, B - Bilateral, sec- seconds, Lb- pounds, min - minutes, HA- Headache, OP- Over pressure, tband- theraband, fwd/bwd- Forward/Backward, TA- Transversus Abdominus, dbl- Double      TREATMENT/SESSION SUMMARY:     · Response to Treatment:  Patient complaints of mild R hip pain to when side lunging to the L side. She presents with core weakness on the R side of deadbugs. She demonstrates improvement in LE strength and progresses to more resistance with squats. The patient denies any aggravation of symptoms and states she is feeling better. · Communication/Consultation:  None today  · Equipment provided today:  Blue theraband  · Recommendations/Intent for next treatment session: Next visit will focus on progressing strength, functional mobility, pain tolerance, and ROM as tolerated. May include lateral plane exercises as tolerated.    Total Treatment Billable Duration:  59 minutes  Time In: 0330  Time Out: 0430    Devan Laura, PT         Charge Capture  }Post Session Pain  MedBridge Portal  MD Guidelines  Scanned Media  Benefits  MyChart    Future Appointments   Date Time Provider Bandar Leigh   7/7/2022  3:30 PM Devan Laura, PT [facility]   7/12/2022  3:30 PM Devan Laura, PT [facility]   7/14/2022  3:30 PM Devan Laura, PT [facility]   7/19/2022  3:30 PM Devan Laura, PT [facility]   7/21/2022  3:30 PM Devan Laura, PT [facility]   7/26/2022  3:30 PM Devan Laura, PT [facility]   7/28/2022  3:30 PM Devan Laura, PT [facility]   8/2/2022  3:30 PM Devan Laura, PT [facility]   8/4/2022  3:30 PM Devan Laura, PT [facility]   8/9/2022  3:30 PM Devan Laura, PT [facility]   8/11/2022  3:30 PM Devan Laura, PT [facility]

## 2022-07-06 ASSESSMENT — PAIN SCALES - GENERAL: PAINLEVEL_OUTOF10: 0

## 2022-07-07 ENCOUNTER — HOSPITAL ENCOUNTER (OUTPATIENT)
Dept: PHYSICAL THERAPY | Age: 32
Setting detail: RECURRING SERIES
Discharge: HOME | End: 2022-07-10
Payer: COMMERCIAL

## 2022-07-07 PROCEDURE — 97110 THERAPEUTIC EXERCISES: CPT

## 2022-07-07 ASSESSMENT — PAIN SCALES - GENERAL: PAINLEVEL_OUTOF10: 0

## 2022-07-07 NOTE — PROGRESS NOTES
posture and promote proper body mechanics. Progressed resistance, range, repetitions and complexity of movement as indicated. Date:  6/29/22 Date:  7/5/22 Date:  7/7/22   Activity/Exercise Parameters Parameters Parameters   NuFit 5 min, level 2 5 min, level 3 ---   Elliptical    6 minutes, Level 3   Knees to chest --- --- ---   Piriformis stretch --- --- 3x30 sec each side, propped on high plinth   Core Birddog: 3x10 · Pallof Press: 3x15, each side    · Physioball rotations: 3x10, each side    · Dead Bug: 3x10 each side   · Pallof Press: 3x15, each side, kneeling  · Bird Dog: 3x10 each   Heel Drops    3x20 each side, 2\" box   Education 5 min: Home exercises --- ---   Trunk rotation Book openers:  1x20 , each side  Next session 3x10, each side, half kneeling   Hip mobility Hip openers, 3x10, each side Next session Hip rocks: 1x10 each side, no UE support   Hip Abduction 2x20, standing  --- ---   Hip Adduction --- --- ---   Hip Extension --- --- ---   Lunges Reverse: 3x10 each Side Lunge: 2x15 each side Reverse: Reverse: 3x10 each   Side Steps  3x20' each side, blue theraband around knees ---   Goblet Squat 2x10, 10#  1x10 15# 2x15, 15#, on slant board to optimize knee ROM ---   MedBridge Portal     MANUAL THERAPY: (0 minutes): Joint mobilization, Soft tissue mobilization and Manipulation was utilized and necessary because of the patient's restricted joint motion, painful spasm, loss of articular motion and restricted motion of soft tissue.  NONE TODAY    · Soft tissue mobilization of lumbar paraspinals and R gluteal region including R TFL, soft to medium pressure    Commonly used abbreviations that may be included in this note:  STM- Soft tissue mobilization, R>L or L>R- right greater than left or vice versa, HEP - Home exercise program, CPA/UPA - Central or unilateral posterior-anterior mobilization, SLS- single leg stance, SKTC - Single leg to chest, SNAGS/NAGS- (sustained) Natural apophyseal glides, TKE- Terminal knee extension, ER- External rotation, IR- Internal rotation, B - Bilateral, sec- seconds, Lb- pounds, min - minutes, HA- Headache, OP- Over pressure, tband- theraband, fwd/bwd- Forward/Backward, TA- Transversus Abdominus, dbl- Double      TREATMENT/SESSION SUMMARY:     · Response to Treatment:   Patient denies any aggravation of her symptoms throughout and after the session. She presents with core weakness on the R side. She progresses to reverse lunges with 10# and demonstrates correct body mechanics. · Communication/Consultation:  None today  · Equipment provided today:  None  · Recommendations/Intent for next treatment session: Next visit will focus on progressing strength, functional mobility, pain tolerance, and ROM as tolerated.     Total Treatment Billable Duration:  58 minutes    Time In: 3875  Time Out: 91 [facility],          Charge Capture  }Post Session Pain  MedBridge Portal  MD Guidelines  Scanned Media  Benefits  MyChart    Future Appointments   Date Time Provider Bandar Leigh   7/12/2022  3:30 PM Forrestine Tara, PT [facility]   7/14/2022  3:30 PM Forrestine Tara, PT [facility]   7/19/2022  3:30 PM Forrestine Tara, PT [facility]   7/21/2022  3:30 PM Forrestine Tara, PT [facility]   7/26/2022  3:30 PM Forrestine Tara, PT [facility]   7/28/2022  3:30 PM Forrestine Tara, PT [facility]   8/2/2022  3:30 PM Forrestine Tara, PT [facility]   8/4/2022  3:30 PM Forrestine Tara, PT [facility]   8/9/2022  3:30 PM Forrestine Tara, PT [facility]   8/11/2022  3:30 PM Forrestine Tara, PT [facility]

## 2022-07-12 ENCOUNTER — HOSPITAL ENCOUNTER (OUTPATIENT)
Dept: PHYSICAL THERAPY | Age: 32
Setting detail: RECURRING SERIES
Discharge: HOME | End: 2022-07-15
Payer: COMMERCIAL

## 2022-07-12 PROCEDURE — 97110 THERAPEUTIC EXERCISES: CPT

## 2022-07-12 NOTE — PROGRESS NOTES
Shayy Guerrero  : 1990  Primary: [facility]  Secondary:  [address] @ [address] [place] [address]  Phone: [phone]  Fax: [phone] Plan Frequency: 2 sessions per week for 60 days    Plan of Care/Certification Expiration Date: 22 (Start date on 22)      PT Visit Info:   No data recorded    OUTPATIENT PHYSICAL THERAPY:OP NOTE TYPE: Treatment Note 2022       Episode  }Appt Desk              Treatment Diagnosis:  Pain in Right Hip (M25.551)  Generalized Muscle Weakness (M62.81)  Low Back Pain (M54.5) Spondylolisthesis, lumbar region [M43.16]  Medical/Referring Diagnosis:  Spondylolisthesis, lumbar region [M43.16]  Radiculopathy, lumbar region [M54.16]  Referring Physician:  Kim Jin MD Orders:  PT Eval and Treat   Date of Onset:  Onset Date: 22 (Chronic sciatic pain since 15yo, worsened 1 month ago)     Allergies:   Patient has no known allergies. Restrictions/Precautions:  No data recordedNo data recorded   Interventions Planned (Treatment may consist of any combination of the following):    Current Treatment Recommendations: Strengthening; ROM; Balance training; Functional mobility training; Endurance training; Gait training; Neuromuscular re-education; Manual Therapy - Soft Tissue Mobilization; Manual Therapy - Joint Manipulation; Pain management; Home exercise program; Modalities; Integrated dry needling; Therapeutic activities     Subjective Comments: Patient reports to therapy without pain. She states she participated in a 20 minute exercise class yesterday. Initial:}     10Post Session:        /10  Medications Last Reviewed:  2022  Updated Objective Findings:  Hip Flexion: R 115°, L116°  Treatment     THERAPEUTIC EXERCISE: (60 minutes):  Exercises per grid below to improve mobility and strength.   Required moderate visual, verbal, manual and tactile cues to promote proper body alignment, promote proper body posture and promote proper body mechanics. Progressed resistance, range, repetitions and complexity of movement as indicated. Date:  6/29/22 Date:  7/5/22 Date:  7/7/22   Activity/Exercise Parameters Parameters Parameters   NuFit 5 min, level 2 5 min, level 3 ---   Elliptical    ---   Airdyne Bike   5 minutes   Shuttle    3x15, 100#, with rocker board   Knees to chest --- --- ---   Piriformis stretch --- --- Blake Denzel Stretch: 3x30 sec each side   Core Birddog: 3x10 · Pallof Press: 3x15, each side    · Physioball rotations: 3x10, each side    · Dead Bug: 3x10 each side   · Side planks: 3x20 sec each side  · Bird Dog: 3x10 each side   Heel Drops  --- --- ---   Education 5 min: Home exercises --- 5 minutes: lifting mechanics, hip stretch, and exercise frequency   Trunk rotation Book openers:  1x20 , each side  Next session ---   Hip mobility Hip openers, 3x10, each side Next session Hurdles: Over and Under: 20x each way   Hip Abduction 2x20, standing  --- ---   Hip Adduction --- --- ---   Hip Extension --- --- ---   Lunges Reverse: 3x10 each Side Lunge: 2x15 each side ---         Side Steps  3x20' each side, blue theraband around knees 3x10 steps each way, blue theraband around knees   Western Anu Dead Lift   1x15 15#  2x20 20#   Goblet Squat 2x10, 10#  1x10 15# 2x15, 15#, on slant board to optimize knee ROM 3x10, 20#   MedBridge Portal     MANUAL THERAPY: (0 minutes): Joint mobilization, Soft tissue mobilization and Manipulation was utilized and necessary because of the patient's restricted joint motion, painful spasm, loss of articular motion and restricted motion of soft tissue.  NONE TODAY    · Soft tissue mobilization of lumbar paraspinals and R gluteal region including R TFL, soft to medium pressure    Commonly used abbreviations that may be included in this note:  STM- Soft tissue mobilization, R>L or L>R- right greater than left or vice versa, HEP - Home exercise program, CPA/UPA - Central or unilateral posterior-anterior mobilization, SLS- single leg stance, SKTC - Single leg to chest, SNAGS/NAGS- (sustained) Natural apophyseal glides, TKE- Terminal knee extension, ER- External rotation, IR- Internal rotation, B - Bilateral, sec- seconds, Lb- pounds, min - minutes, HA- Headache, OP- Over pressure, tband- theraband, fwd/bwd- Forward/Backward, TA- Transversus Abdominus, dbl- Double      TREATMENT/SESSION SUMMARY:     · Response to Treatment:   Patient continues to progress in strength and mobility. She denies fatigue and pain at end of session. R side core weakness is noted when compared to L side. Patient presents with decreased hip mobility during squat movements. · Communication/Consultation:  None today  · Equipment provided today:  None  · Recommendations/Intent for next treatment session: Next visit will focus on progressing strength, functional mobility, pain tolerance, and ROM as tolerated.     Total Treatment Billable Duration:  60 minutes    Time In: 3729  Time Out: [address] [facility], PT         Charge Capture  }Post Session Pain  MedBridge Portal  MD Guidelines  Scanned Media  Benefits  MyChart    Future Appointments   Date Time Provider Bandar Leigh   7/14/2022  3:30 PM Landenberg Pour, PT [facility]   7/19/2022  3:30 PM Maura Pour, PT [facility]   7/21/2022  3:30 PM Maura Pour, PT [facility]   7/26/2022  3:30 PM Landenberg Pour, PT [facility]   7/28/2022  3:30 PM Maura Pour, PT [facility]   8/2/2022  3:30 PM Maura Pour, PT [facility]   8/4/2022  3:30 PM Landenberg Pour, PT [facility]   8/9/2022  3:30 PM Maura Pour, PT [facility]   8/11/2022  3:30 PM Maura Pour, PT [facility]

## 2022-07-14 ENCOUNTER — HOSPITAL ENCOUNTER (OUTPATIENT)
Dept: PHYSICAL THERAPY | Age: 32
Setting detail: RECURRING SERIES
Discharge: HOME | End: 2022-07-17
Payer: COMMERCIAL

## 2022-07-14 PROCEDURE — 97110 THERAPEUTIC EXERCISES: CPT

## 2022-07-14 NOTE — PROGRESS NOTES
Verenice Mae  : 1990  Primary: [facility]  Secondary:  [address] @ [facility] [address]  Phone: [phone]  Fax: [phone] Plan Frequency: 2 sessions per week for 60 days    Plan of Care/Certification Expiration Date: 22 (Start date on 22)      PT Visit Info:   No data recorded    OUTPATIENT PHYSICAL THERAPY:OP NOTE TYPE: Treatment Note 2022       Episode  }Appt Desk              Treatment Diagnosis:  Pain in Right Hip (M25.551)  Generalized Muscle Weakness (M62.81)  Low Back Pain (M54.5) Spondylolisthesis, lumbar region [M43.16]  Medical/Referring Diagnosis:  Spondylolisthesis, lumbar region [M43.16]  Radiculopathy, lumbar region [M54.16]  Referring Physician:  Marcellus Elmore MD Orders:  PT Eval and Treat   Date of Onset:  Onset Date: 22 (Chronic sciatic pain since 15yo, worsened 1 month ago)     Allergies:   Patient has no known allergies. Restrictions/Precautions:  No data recordedNo data recorded   Interventions Planned (Treatment may consist of any combination of the following):    Current Treatment Recommendations: Strengthening; ROM; Balance training; Functional mobility training; Endurance training; Gait training; Neuromuscular re-education; Manual Therapy - Soft Tissue Mobilization; Manual Therapy - Joint Manipulation; Pain management; Home exercise program; Modalities; Integrated dry needling; Therapeutic activities     Subjective Comments: Patient reports to therapy without pain. She states she experienced R low back pain at night so she propped her legs on a pillow which helped alleviate her symptoms. Initial:}    0/10Post Session:       0/10  Medications Last Reviewed:  2022  Updated Objective Findings:  See progress note from today  Treatment     THERAPEUTIC EXERCISE: (62 minutes):  Exercises per grid below to improve mobility and strength.   Required moderate visual, verbal, manual and tactile cues to promote proper body alignment, promote proper body posture and promote proper body mechanics. Progressed resistance, range, repetitions and complexity of movement as indicated. Date:  7/14/22 Date:  7/5/22 Date:  7/7/22   Activity/Exercise Parameters Parameters Parameters   NuFit 5 min, level 5 5 min, level 3 ---   Elliptical  --  ---   Airdyne Bike --  5 minutes   Shuttle  --  3x15, 100#, with rocker board   Knees to chest --- --- ---   Piriformis stretch --- --- Jessica Jewels Stretch: 3x30 sec each side   Core Deadbug: 3x10     · Pallof Press: 3x15, each side    · Physioball rotations: 3x10, each side    · Dead Bug: 3x10 each side   · Side planks: 3x20 sec each side  · Bird Dog: 3x10 each side   Suitcase Carry 3x 90' each side, 25 lb dumbbell     Heel Drops  --- --- ---   Education 5 min: Home exercises --- 5 minutes: lifting mechanics, hip stretch, and exercise frequency   Trunk rotation Book openers:  1x20 , each side  Next session ---   Hip mobility Hip openers, 3x10, each side Next session Hurdles: Over and Under: 20x each way   Hip Abduction 2x20, standing  --- ---   Hip Adduction --- --- ---   Hip Extension --- --- ---   Lunges Reverse: 3x10 each Side Lunge: 2x15 each side ---         Side Steps --- 3x20' each side, blue theraband around knees 3x10 steps each way, blue theraband around knees   Geary Anu Dead Lift Single Leg: 2x10, each side  1x15 15#  2x20 20#   Goblet Squat 2x10, 10#  1x10 15# 2x15, 15#, on slant board to optimize knee ROM 3x10, 20#   MedBridge Portal     MANUAL THERAPY: (0 minutes): Joint mobilization, Soft tissue mobilization and Manipulation was utilized and necessary because of the patient's restricted joint motion, painful spasm, loss of articular motion and restricted motion of soft tissue.  NONE TODAY    · Soft tissue mobilization of lumbar paraspinals and R gluteal region including R TFL, soft to medium pressure    Commonly used abbreviations that may be included in this note:  STM- Soft tissue mobilization, R>L or L>R- right greater than left or vice versa, HEP - Home exercise program, CPA/UPA - Central or unilateral posterior-anterior mobilization, SLS- single leg stance, SKTC - Single leg to chest, SNAGS/NAGS- (sustained) Natural apophyseal glides, TKE- Terminal knee extension, ER- External rotation, IR- Internal rotation, B - Bilateral, sec- seconds, Lb- pounds, min - minutes, HA- Headache, OP- Over pressure, tband- theraband, fwd/bwd- Forward/Backward, TA- Transversus Abdominus, dbl- Double      TREATMENT/SESSION SUMMARY:     · Response to Treatment:   Patient completes today's intervention without aggravation of symptoms. B hip abductor weakness is noted during Single leg RDLs. She requires verbal and tactile cueing for gluteal and core activation. · Communication/Consultation:  None today  · Equipment provided today:  None  · Recommendations/Intent for next treatment session: Next visit will focus on progressing strength, functional mobility, pain tolerance, and ROM as tolerated.     Total Treatment Billable Duration:  62 minutes    Time In: 9115  Time Out: [address], PT         Charge Capture  }Post Session Pain  MedBridge Portal  MD Guidelines  Scanned Media  Benefits  MyChart    Future Appointments   Date Time Provider Bandar Leigh   7/19/2022  3:30 PM Milka Dillard, PT [facility]   7/21/2022  3:30 PM Milka Dillard, PT [facility]   7/26/2022  3:30 PM Milka Dillard, PT [facility]   7/28/2022  3:30 PM Milka Dillard, PT [facility]   8/2/2022  3:30 PM Milka Dillard, PT [facility]   8/4/2022  3:30 PM Milka Dillard, PT [facility]   8/9/2022  3:30 PM Milka Dillard, PT [facility]   8/11/2022  3:30 PM Milka Dillard, PT [facility]   8/15/2022  2:15 PM Reanna Chen DO [facility]

## 2022-07-14 NOTE — PROGRESS NOTES
Mario Branch  : 1990  Primary: [facility]  Secondary:  [address] @ [address] [address]  Phone: [phone]  Fax: [phone] Plan Frequency: 2 sessions per week for 60 days    Plan of Care/Certification Expiration Date: 22 (Start date on 22)      PT Visit Info:    No data recorded    OUTPATIENT PHYSICAL THERAPY:OP NOTE TYPE: Progress Report 2022               Episode  Appt Desk         Treatment Diagnosis:  Pain in Right Hip (M25.551)  Generalized Muscle Weakness (M62.81)  Low Back Pain (M54.5) Spondylolisthesis, lumbar region [M43.16]    Medical/Referring Diagnosis:  Spondylolisthesis, lumbar region [M43.16]  Radiculopathy, lumbar region [M54.16]  Referring Physician:  Aashish Beltran MD Orders:  PT Eval and Treat   Return MD Appt:  None scheduled  Date of Onset:  Onset Date: 22 (Chronic sciatic pain since 15yo, worsened 1 month ago)     Allergies:  Patient has no known allergies. Restrictions/Precautions:    No data recordedNo data recorded   Medications Last Reviewed:  2022     SUBJECTIVE   History of Injury/Illness (Reason for Referral):     Ms. Mario Branch has attended 1 physical therapy session including initial evaluation as of 2022. She presents with low back pain which radiates towards the posterior RLE, terminating below the knee. She reports her pain originates from when she gave birth to her her first child about 3 years ago. Her chronic pain has been mostly manageable but increased a month ago. The patient is unable to lift objects at work, sleep through the night, stand and sit for long durations, and run for exercise without aggravating her symptoms. Patient sugns and symptoms are alleviated with low energy movements and the prednisone which was prescribed to her.  Mario Branch presents with signs and symptoms of increased pain, decreased ROM, decreased strength, decreased functional tolerance. Whitney Guido will benefit from home exercise program, therapeutic and postural strengthening exercises, manual therapeutic techniques as appropriate to address Warren Mccollum's current condition. Progress Note:   Whitney Guido   70%  Imrpovements in leg pain, posture, inflammation and swelling has dissipated  Still needs work on R lumbar pain, occasional     Patient Stated Goal(s):  Patient goals are to return to pain free mobility and high energy activities. Initial:     0/10 Post Session:     0/10  Past Medical History/Comorbidities:   Ms. Yajaira Salcedo  has no past medical history on file. Ms. Yajaira Salcedo  has no past surgical history on file. Social History/Living Environment:    Lives With: Spouse  Home Layout: One level     Prior Level of Function/Work/Activity:   Prior level of function: Patient reports being active and frequnetly ran for exercise before her symptoms worsened    Learning:   Does the patient/guardian have any barriers to learning?: No barriers  What is the preferred language of the patient/guardian?: English  Is an  required?: No  How does the patient/guardian prefer to learn new concepts?: Listening; Reading; Demonstration; Pictures/Videos     Fall Risk Scale:    Total Score: 0  Barlow Fall Risk: Low (0-24)     Dominant Side:  right handed        OBJECTIVE           PALPATION/TONE/TISSUE TEXTURE: Date:   6/7/2022   SOFT TISSUE:   Lumbar extensors unremarkable   QL unremarkable   PAIVM:   Lumbar Mild hypomobility of lower lumbar vertebrae centrally     ROM Date:  6/7/2022 Date:  7/14/2022    LUMBAR ROM (TESTED IN STANDING)      RIGHT LEFT Right Left   Flexion 8° -- 13° ---   Extension NT -- 15° ---   Rotation NT NT 51 38   HIP ROM     Flexion 94°, PROM 125° 100°, PROM 122° 121° 120°   Extension NT NT         STRENGTH   Date:   6/7/2022  Date:  7/14/2022     Right Left Right Left   Hip Abduction 4 4+     Hip Adduction NT NT     Hip IR 4 4 4 4   Hip ER 4+ 4+ 4+ 4+   Hip Flexion 4- 4 4+ 4   Knee Extension 4+ 4+ 5 5   Knee Flexion 4 4 4 4+                -MYOTOMES Date: 6/7/2022     Right Left   L2 & L3   (Hip Flexors) 4/5 4/5   L3-L4  (Knee Extensors) 5/5 5/5     -REFLEXES: Date: 6/7/2022     Right Left   L4  (Quadriceps) NT NT   S1  (Achilles) NT NT     RED FLAGS: Date: 6/7/2022   Non-Mechanical pain distribution (cannot be produced, changed, or reduced during exam): NO   Cauda Equina Dysfunction: NO   Upper lumbar disc herniation in younger patients (femoral nerve tension test for lateral disc herniation in lower lumbar): NO   Lumbar compression fracture (age > 48, trauma, corticosteroid use NO   Spine Cancer (age > 48, pervious history of cancer, failure to improve in 1 month of therapy, no relief - be rest, duration > 1 month, unexplained weight loss, insidious onset, constitutional symptoms): NO   Ankylosing Spondylitis (age < 36, pain not relieved by supine, morning back stiffness, pain duration > 3 months, improved by exercise): NO   Sacral Fracture: NO          ASSESSMENT   Initial Assessment:     Ms. Rylie Carrillo has attended 1 physical therapy session including initial evaluation as of 6/7/2022. She presents with low back pain which radiates towards the posterior RLE, terminating below the knee. She reports her pain originates from when she gave birth to her her first child about 3 years ago. Her chronic pain has been mostly manageable but increased a month ago. The patient is unable to lift objects at work, sleep through the night, stand and sit for long durations, and run for exercise without aggravating her symptoms. Patient sugns and symptoms are alleviated with low energy movements and the prednisone which was prescribed to her. Rylie Carrillo presents with signs and symptoms of increased pain, decreased ROM, decreased strength, decreased functional tolerance.   Rylie Carrillo will benefit from home exercise program, therapeutic and postural strengthening exercises, manual therapeutic techniques as appropriate to address [address] Mccollum's current condition. Ellyn Silver will benefit from skilled PT (medically necessary) to address above deficits affecting participation in basic ADLs and overall functional tolerance. Progress Note:  Ms. Ellyn Silver has attended 7 physical therapy sessions as of 7/14/2022. She now presents with occasional R low back pain but does not travel. The patient still complaints of difficulty with pain aggravation with lifting heavy objects at work and occasionally woken up due to R low back discomfort. Patient has progressed in strength, ROM, Flexibility, and functional activity tolerance. She now reports being able to run for exercise without aggravating her symptoms. The patient has met all but one of her goals and continues and has improved her score on the Modified Oswestry Low Back Questionnaire. Ellyn Silver will continue to benefit from home exercise program, therapeutic and postural strengthening exercises, manual therapeutic techniques as appropriate to address [address] Mccollum's current condition. Ellyn Silver will benefit from skilled PT (medically necessary) to address work related activities, strength deficits, mobility restrictions, pain modulation, and functional activity tolerance  Problem List: (Impacting functional limitations): Body Structures, Functions, Activity Limitations Requiring Skilled Therapeutic Intervention: Decreased functional mobility ; Decreased ROM; Decreased body mechanics; Decreased tolerance to work activity; Decreased strength; Decreased endurance;  Increased pain; Decreased posture     Therapy Prognosis:   Therapy Prognosis: Good     Assessment Complexity:   Low Complexity  PLAN   Effective Dates: 7/14/2022   TO Plan of Care/Certification Expiration Date: 08/06/22 (Start date on 6/7/22)     Frequency/Duration: Plan Frequency: 2 sessions per week for 60 days     Interventions Planned

## 2022-07-15 ASSESSMENT — PAIN SCALES - GENERAL: PAINLEVEL_OUTOF10: 0

## 2022-07-19 ENCOUNTER — HOSPITAL ENCOUNTER (OUTPATIENT)
Dept: PHYSICAL THERAPY | Age: 32
Setting detail: RECURRING SERIES
Discharge: HOME | End: 2022-07-22
Payer: COMMERCIAL

## 2022-07-19 PROCEDURE — 97110 THERAPEUTIC EXERCISES: CPT

## 2022-07-19 ASSESSMENT — PAIN SCALES - GENERAL: PAINLEVEL_OUTOF10: 0

## 2022-07-19 NOTE — PROGRESS NOTES
Aster Jamal  : 1990  Primary: [facility]  Secondary:  [address] @ [address] [facility] [address]  Phone: [phone]  Fax: [phone] Plan Frequency: 2 sessions per week for 60 days    Plan of Care/Certification Expiration Date: 22 (Start date on 22)      PT Visit Info:   No data recorded    OUTPATIENT PHYSICAL THERAPY:OP NOTE TYPE: Treatment Note 2022       Episode  }Appt Desk              Treatment Diagnosis:  Pain in Right Hip (M25.551)  Generalized Muscle Weakness (M62.81)  Low Back Pain (M54.5) Spondylolisthesis, lumbar region [M43.16]  Medical/Referring Diagnosis:  Spondylolisthesis, lumbar region [M43.16]  Radiculopathy, lumbar region [M54.16]  Referring Physician:  Omer Gleason MD Orders:  PT Eval and Treat   Date of Onset:  Onset Date: 22 (Chronic sciatic pain since 15yo, worsened 1 month ago)     Allergies:   Patient has no known allergies. Restrictions/Precautions:  No data recordedNo data recorded   Interventions Planned (Treatment may consist of any combination of the following):    Current Treatment Recommendations: Strengthening; ROM; Balance training; Functional mobility training; Endurance training; Gait training; Neuromuscular re-education; Manual Therapy - Soft Tissue Mobilization; Manual Therapy - Joint Manipulation; Pain management; Home exercise program; Modalities; Integrated dry needling; Therapeutic activities     Subjective Comments: Patient reports to therapy without pain but feels mild stiffness in her R hip. Initial:}    0/10Post Session:       0/10  Medications Last Reviewed:  2022  Updated Objective Findings:  None Today  Treatment     THERAPEUTIC EXERCISE: (55 minutes):  Exercises per grid below to improve mobility and strength.   Required moderate visual, verbal, manual and tactile cues to promote proper body alignment, promote proper body posture and promote proper body mechanics. Progressed resistance, range, repetitions and complexity of movement as indicated. Date:  7/14/22 Date:  7/19/22 Date:  7/7/22   Activity/Exercise Parameters Parameters Parameters   NuFit 5 min, level 5 5 min, level 5 ---   Elliptical  --  ---   Airdyne Bike --  5 minutes   Shuttle  --  3x15, 100#, with rocker board   Bridges --- Single leg, 3x10 each side ---   Piriformis stretch --- 2x30 sec each side  Stinnett Stretch: 3x30 sec each side   Core Deadbug: 3x10     Dead Bug: 2x15 each side   Side planks: 3x20 sec each side  Bird Dog: 3x10 each side   Suitcase Carry 3x 90' each side, 25 lb dumbbell 3x 90' each side, 25 lb dumbbell    Heel Drops  --- --- ---   Education 5 min: Home exercises --- 5 minutes: lifting mechanics, hip stretch, and exercise frequency   Trunk mobility  Foam roll lumbar extension, 4x10 deep breaths ---   Hip mobility Hip openers, 3x10, each side  Hurdles: Over and Under: 20x each way   Hip Abduction 2x20, standing  --- ---   Hip Adduction --- --- ---   Single leg 3-way --- 2x10 each side, forward, lateral, back ---   Lunges Reverse: 3x10 each Walking Lunge: 3x30' ---   Lux[place] Split squat  2x10 each side    Side Steps --- 3x20' each side, blue theraband around knees 3x10 steps each way, blue theraband around knees   Clint Anu Dead Lift Single Leg: 2x10, each side 3x15 30# 1x15 15#  2x20 20#   Goblet Squat 2x10, 10#  1x10 15# 2x15, 15#, on slant board to optimize knee ROM 3x10, 20#   MedBridge Portal     MANUAL THERAPY: (5 minutes): Joint mobilization, Soft tissue mobilization and Manipulation was utilized and necessary because of the patient's restricted joint motion, painful spasm, loss of articular motion and restricted motion of soft tissue.  NOT BILLED    Soft tissue mobilization of lumbar paraspinals and R gluteal region    Commonly used abbreviations that may be included in this note:  STM- Soft tissue mobilization, R>L or L>R- right greater than left or vice versa, HEP - Home exercise program, CPA/UPA - Central or unilateral posterior-anterior mobilization, SLS- single leg stance, SKTC - Single leg to chest, SNAGS/NAGS- (sustained) Natural apophyseal glides, TKE- Terminal knee extension, ER- External rotation, IR- Internal rotation, B - Bilateral, sec- seconds, Lb- pounds, min - minutes, HA- Headache, OP- Over pressure, tband- theraband, fwd/bwd- Forward/Backward, TA- Transversus Abdominus, dbl- Double      TREATMENT/SESSION SUMMARY:     Response to Treatment:   Patient completes today's intervention without aggravation of symptoms. She presents with RLE weakness, noted by R knee valgus during walking lunges. Patient expresses fatigue following the single leg hip bridges. She progresses to 15 reps of 30# RDLs. Communication/Consultation:  None today  Equipment provided today:  None  Recommendations/Intent for next treatment session: Next visit will focus on progressing strength, functional mobility, pain tolerance, and ROM as tolerated.     Total Treatment Billable Duration:  55 minutes    Time In: 7908  Time Out: [address] [place] [address], PT         Charge Capture  }Post Session Pain  MedBridge Portal  MD Guidelines  Scanned Media  Benefits  MyChart    Future Appointments   Date Time Provider Bandar Leigh   7/21/2022  3:30 PM Brenda Nicole, PT [facility]   7/26/2022  3:30 PM Brenda Nicole, PT [facility]   7/28/2022  3:30 PM Brenda Nicole, PT [facility]   8/2/2022  3:30 PM Brenda Nicole, PT [facility]   8/4/2022  3:30 PM Brenda Nicole, PT [facility]   8/9/2022  3:30 PM Brenda Nicole, PT [facility]   8/11/2022  3:30 PM Brenda Nicole, PT [facility]   8/15/2022  2:15 PM Reanna Chen DO [facility] 1 GVL AMB

## 2022-07-21 ENCOUNTER — HOSPITAL ENCOUNTER (OUTPATIENT)
Dept: PHYSICAL THERAPY | Age: 32
Setting detail: RECURRING SERIES
Discharge: HOME | End: 2022-07-24
Payer: COMMERCIAL

## 2022-07-21 PROCEDURE — 97110 THERAPEUTIC EXERCISES: CPT

## 2022-07-21 ASSESSMENT — PAIN SCALES - GENERAL: PAINLEVEL_OUTOF10: 0

## 2022-07-21 NOTE — PROGRESS NOTES
Shayy Gongoraalex  : 1990  Primary: [facility]  Secondary:  Jovani Monterroso @ [address] [place] [address]  Phone: [phone]  Fax: [phone] Plan Frequency: 2 sessions per week for 60 days    Plan of Care/Certification Expiration Date: 22 (Start date on 22)      PT Visit Info:   No data recorded    OUTPATIENT PHYSICAL THERAPY:OP NOTE TYPE: Treatment Note 2022       Episode  }Appt Desk              Treatment Diagnosis:  Pain in Right Hip (M25.551)  Generalized Muscle Weakness (M62.81)  Low Back Pain (M54.5) Spondylolisthesis, lumbar region [M43.16]  Medical/Referring Diagnosis:  Spondylolisthesis, lumbar region [M43.16]  Radiculopathy, lumbar region [M54.16]  Referring Physician:  Kim Jin MD Orders:  PT Eval and Treat   Date of Onset:  Onset Date: 22 (Chronic sciatic pain since 13yo, worsened 1 month ago)     Allergies:   Patient has no known allergies. Restrictions/Precautions:  No data recordedNo data recorded   Interventions Planned (Treatment may consist of any combination of the following):    Current Treatment Recommendations: Strengthening; ROM; Balance training; Functional mobility training; Endurance training; Gait training; Neuromuscular re-education; Manual Therapy - Soft Tissue Mobilization; Manual Therapy - Joint Manipulation; Pain management; Home exercise program; Modalities; Integrated dry needling; Therapeutic activities     Subjective Comments: Patient reports to therapy without complaints of pain. She is still feeling soreness when she sleeps and states she will adjust her her sleeping position. Initial:}    0/10Post Session:       0/10  Medications Last Reviewed:  2022  Updated Objective Findings:   Thoracolumbar rotation in sitting R 50°, L 38°   Treatment     THERAPEUTIC EXERCISE: (48 minutes):  Exercises per grid below to improve mobility and strength.   Required moderate visual, mobilization of lumbar paraspinals and R gluteus medius  PA  grades 3-4 glide of lower lumbar centrally    Commonly used abbreviations that may be included in this note:  STM- Soft tissue mobilization, R>L or L>R- right greater than left or vice versa, HEP - Home exercise program, CPA/UPA - Central or unilateral posterior-anterior mobilization, SLS- single leg stance, SKTC - Single leg to chest, SNAGS/NAGS- (sustained) Natural apophyseal glides, TKE- Terminal knee extension, ER- External rotation, IR- Internal rotation, B - Bilateral, sec- seconds, Lb- pounds, min - minutes, HA- Headache, OP- Over pressure, tband- theraband, fwd/bwd- Forward/Backward, TA- Transversus Abdominus, dbl- Double      TREATMENT/SESSION SUMMARY:     Response to Treatment:   Patient reports LE fatigue following the single leg squats. She requires verbal cueing for core activation. She denies any aggravation of her symptoms following the session. Communication/Consultation:  None today  Equipment provided today:  None  Recommendations/Intent for next treatment session: Next visit will focus on progressing strength, functional mobility, pain tolerance, and ROM as tolerated.     Total Treatment Billable Duration:  58 minutes    Time In: 9650  Time Out: 1515 Tiffany Macias PT         Charge Capture  }Post Session Pain  MedBridge Portal  MD Guidelines  Scanned Media  Benefits  MyChart    Future Appointments   Date Time Provider Bandar Leigh   7/26/2022  3:30 PM Brenda Nicole, PT [facility]   7/28/2022  3:30 PM Brenda Nicole, PT [facility]   8/2/2022  3:30 PM Brenda Nicole, PT [facility]   8/4/2022  3:30 PM Brenda Nicole, PT [facility]   8/9/2022  3:30 PM Brenda Nicole, PT [facility]   8/11/2022  3:30 PM Brenda Nicole, PT [facility]   8/15/2022  2:15 PM Reanna Chen DO [facility] 1 GVL AMB

## 2022-07-26 ENCOUNTER — HOSPITAL ENCOUNTER (OUTPATIENT)
Dept: PHYSICAL THERAPY | Age: 32
Setting detail: RECURRING SERIES
Discharge: HOME | End: 2022-07-29
Payer: COMMERCIAL

## 2022-07-26 PROCEDURE — 97110 THERAPEUTIC EXERCISES: CPT

## 2022-07-26 NOTE — PROGRESS NOTES
Charmayne Coral  : 1990  Primary: [facility]  Secondary:  Tiffany Barber @ [address] [facility] [address]  Phone: [phone]  Fax: [phone] Plan Frequency: 2 sessions per week for 60 days    Plan of Care/Certification Expiration Date: 22 (Start date on 22)      PT Visit Info:   No data recorded    OUTPATIENT PHYSICAL THERAPY:OP NOTE TYPE: Treatment Note 2022       Episode  }Appt Desk              Treatment Diagnosis:  Pain in Right Hip (M25.551)  Generalized Muscle Weakness (M62.81)  Low Back Pain (M54.5) Spondylolisthesis, lumbar region [M43.16]  Medical/Referring Diagnosis:  Spondylolisthesis, lumbar region [M43.16]  Radiculopathy, lumbar region [M54.16]  Referring Physician:  Lynnann Spurling MD Orders:  PT Eval and Treat   Date of Onset:  Onset Date: 22 (Chronic sciatic pain since 15yo, worsened 1 month ago)     Allergies:   Patient has no known allergies. Restrictions/Precautions:  No data recordedNo data recorded   Interventions Planned (Treatment may consist of any combination of the following):    Current Treatment Recommendations: Strengthening; ROM; Balance training; Functional mobility training; Endurance training; Gait training; Neuromuscular re-education; Manual Therapy - Soft Tissue Mobilization; Manual Therapy - Joint Manipulation; Pain management; Home exercise program; Modalities; Integrated dry needling; Therapeutic activities     Subjective Comments: Patient reports to therapy without complaints of pain. She states she is able to work a full day without complaints and is practicing correct lifting mechanics. Initial:}    0/10Post Session:       0/10  Medications Last Reviewed:  2022  Updated Objective Findings:   See discharge note  Treatment     THERAPEUTIC EXERCISE: (59 minutes):  Exercises per grid below to improve mobility and strength.   Required moderate visual, verbal, manual and tactile cues to promote proper body alignment, promote proper body posture and promote proper body mechanics. Progressed resistance, range, repetitions and complexity of movement as indicated. Date:  7/26/22 Date:  7/19/22 Date:  7/21/22   Activity/Exercise Parameters Parameters Parameters   NuFit --- 5 min, level 5 ---   Elliptical  5 min, level 3  ---   Airdyne Bike --  6 minutes   Shuttle  3x10, 150#, with rocker board  3x15, 100#, with rocker board   Bridges --- Single leg, 3x10 each side ---   TFL Stretch 2x30 sec each side     Piriformis stretch 2x30 sec each side 2x30 sec each side  ---   Core Side plank: 3x20 sec each side     Dead Bug: 2x15 each side   Pallof Press: 10 lbs. Each side, kneeling on Airex pad  Plank: 3x45 sec   Suitcase Carry 3x 90' each side, 25 lb dumbbell 3x 90' each side, 25 lb dumbbell    Dips 2x15, 3\" box --- ---   Education  --- 5 minutes: lifting mechanics, hip stretch, and exercise frequency   Trunk mobility  Foam roll lumbar extension, 4x10 deep breaths Bretzel Stretch: [id number] each side    Book Opener: 2x10 each side   Hip mobility   Hurdles: Over and Under: 20x each way   Hip Abduction 2x20, standing  --- ---   Single leg 3-way --- 2x10 each side, forward, lateral, back ---   Lunges Walking Lunge: 3x30' Walking Lunge: 3x30' ---   Lux[place] Split squat  2x10 each side    Side Steps --- 3x20' each side, blue theraband around knees 3x10 steps each way, blue theraband around knees   Western Anu Dead Lift Single Leg: 2x10, each side 3x15 30# 1x15 15#  2x20 20#   Goblet Squat 2x10, 10#  1x10 15# 2x15, 15#, on slant board to optimize knee ROM 3x10, 20#   Single Leg squat   2x10 each side, on [facility]          [facility] Portal     MANUAL THERAPY: (0 minutes): Joint mobilization, Soft tissue mobilization and Manipulation was utilized and necessary because of the patient's restricted joint motion, painful spasm, loss of articular motion and restricted motion of soft tissue.      Soft tissue mobilization of lumbar paraspinals and R gluteus medius  PA  grades 3-4 glide of lower lumbar centrally    Commonly used abbreviations that may be included in this note:  STM- Soft tissue mobilization, R>L or L>R- right greater than left or vice versa, HEP - Home exercise program, CPA/UPA - Central or unilateral posterior-anterior mobilization, SLS- single leg stance, SKTC - Single leg to chest, SNAGS/NAGS- (sustained) Natural apophyseal glides, TKE- Terminal knee extension, ER- External rotation, IR- Internal rotation, B - Bilateral, sec- seconds, Lb- pounds, min - minutes, HA- Headache, OP- Over pressure, tband- theraband, fwd/bwd- Forward/Backward, TA- Transversus Abdominus, dbl- Double      TREATMENT/SESSION SUMMARY:     Response to Treatment:   Patient denies any pain or aggravation to her symptoms. She states she feels pain free and ready to continue on independently with HEP. The patient is appropriate for discharge at this time unless otherwise indicated. Communication/Consultation:  None today  Equipment provided today:  None  Recommendations/Intent for next treatment session: Patient will continue with independent HEP and is appropriate for discharge until otherwise indicated.    Total Treatment Billable Duration:  59 minutes    Time In: 8982  Time Out: 5325 Tiffany Macias, PT         Charge Capture  }Post Session Pain  MedBridge Portal  MD Guidelines  Scanned Media  Benefits  MyChart    Future Appointments   Date Time Provider Bandar Leigh   8/15/2022  2:15 PM Reanna Chen DO [facility] 1 GVL AMB

## 2022-07-27 ASSESSMENT — PAIN SCALES - GENERAL: PAINLEVEL_OUTOF10: 0

## 2022-07-28 ENCOUNTER — HOSPITAL ENCOUNTER (OUTPATIENT)
Dept: PHYSICAL THERAPY | Age: 32
Setting detail: RECURRING SERIES
End: 2022-07-28
Payer: COMMERCIAL

## 2022-08-01 NOTE — THERAPY DISCHARGE
Mike Oh  : 1990  Primary: [facility]  Secondary:  [address] @ [address] [facility] [address]  Phone: [phone]  Fax: [phone] Plan Frequency: Patient to be discharged    Plan of Care/Certification Expiration Date: 22 (Start date on 22)      PT Visit Info:    No data recorded    OUTPATIENT PHYSICAL THERAPY:OP NOTE TYPE: Discharge Summary 2022               Episode  Appt Desk         Treatment Diagnosis:  Pain in Right Hip (M25.551)  Generalized Muscle Weakness (M62.81)  Low Back Pain (M54.5) Spondylolisthesis, lumbar region [M43.16]    Medical/Referring Diagnosis:  Spondylolisthesis, lumbar region [M43.16]  Radiculopathy, lumbar region [M54.16]  Referring Physician:  Alma Johnson MD Orders:  PT Eval and Treat   Return MD Appt:  None scheduled  Date of Onset:  Onset Date: 22 (Chronic sciatic pain since 15yo, worsened 1 month ago)     Allergies:  Patient has no known allergies. Restrictions/Precautions:    No data recordedNo data recorded   Medications Last Reviewed:  2022             OBJECTIVE           PALPATION/TONE/TISSUE TEXTURE: Date:   2022   SOFT TISSUE:   Lumbar extensors unremarkable   QL unremarkable   PAIVM:   Lumbar Mild hypomobility of lower lumbar vertebrae centrally     ROM Date:  2022 Date:  2022    LUMBAR ROM (TESTED IN STANDING)      RIGHT LEFT Right Left   Extension NT -- 10° ---   Rotation NT NT 54 45   HIP ROM     Flexion 94°, PROM 125° 100°, PROM 122° 121° 120°       STRENGTH   Date:   2022       Right Left  Left   Hip Abduction 4 4+ 4+ 4+   Hip IR 4 4 4 4   Hip ER 4+ 4+ 4+ 4+   Hip Flexion 4- 4 4+ 4+   Knee Extension 4+ 4+ 5 5   Knee Flexion 4 4 4 4+                           ASSESSMENT   Initial Assessment:     Ms. Mike Oh has attended 1 physical therapy session including initial evaluation as of 2022.  She presents with low back pain which radiates towards the posterior RLE, terminating below the knee. She reports her pain originates from when she gave birth to her her first child about 3 years ago. Her chronic pain has been mostly manageable but increased a month ago. The patient is unable to lift objects at work, sleep through the night, stand and sit for long durations, and run for exercise without aggravating her symptoms. Patient sugns and symptoms are alleviated with low energy movements and the prednisone which was prescribed to her. Lydia Esparza presents with signs and symptoms of increased pain, decreased ROM, decreased strength, decreased functional tolerance. Lydia Esparza will benefit from home exercise program, therapeutic and postural strengthening exercises, manual therapeutic techniques as appropriate to address Warren Mccollum's current condition. Lydia Esparza will benefit from skilled PT (medically necessary) to address above deficits affecting participation in basic ADLs and overall functional tolerance. Therapy Discharge:  Ms. Lydia Esparza has attended 11 physical therapy sessions as of 7/14/2022. She now presents without low back pain. The patient is now able to lift heavy objects at work and sleep through the night. Patient has progressed in strength, ROM, Flexibility, and functional activity tolerance. She now reports being able to exercise at the gym without complaints. Patient has shown improvements in strength, ROM, functional mobility, and pain tolerance. The patient has met all of her goals and  has improved her score on the Modified Oswestry Low Back Questionnaire. Lydia Esparza is appropriate and agreeable for discharge at this time unless otherwise indicated.           PLAN   Effective Dates: 7/26/2022   TO Plan of Care/Certification Expiration Date: 08/06/22 (Start date on 6/7/22)     Frequency/Duration: Plan Frequency: Patient to be discharged     Interventions Planned (Treatment may consist of any combination of the following):    Current Treatment Recommendations: Strengthening; ROM; Balance training; Functional mobility training; Endurance training; Gait training; Neuromuscular re-education; Manual Therapy - Soft Tissue Mobilization; Manual Therapy - Joint Manipulation; Pain management; Home exercise program; Modalities; Integrated dry needling; Therapeutic activities     GOALS: (Goals have been discussed and agreed upon with patient.)  Short Term Goals 4 weeks   Whitney Guido will be independent with HEP to promote self-management of symptoms. GOAL MET 7/14/2022  Whitney Guido will participate in LE strengthening program with weights as appropriate to help with gait and elevations. GOAL MET 7/14/2022  Whitney Guido will participate in static and dynamic balance activities to decrease the risk for falls and improve overall QOL. GOAL MET 7/14/2022  Whitney Guido will tolerate manual therapy/joint mobilizations/soft tissue to increase ROM and decrease pain to improve functional mobility during ADLs. GOAL MET 7/14/2022    Discharge Goals 12 weeks  Whitney Guido will demonstrate a 5 point improvement on the Modified Oswestry Low Back Pain Disability to show improvement in function. GOAL MET 7/26/2022  Whitney Guido will demonstrate >=4+/5 LE strength on manual muscle testing to improve functional mobility. GOAL MET 7/14/2022  Whitney Guido will be able to lift 20 lbs. from the ground 10x with minimal complaints of pain to demonstrate return to prior level of function GOAL MET 7/14/2022  Whitney Guido will be able to demonstrate safe lifting and transfer mechanics without cueing for improved safety with home, childcare, and community activities. GOAL MET 7/14/2022  Whitney Guido will report being able to run for 15 minutes to demonstrate ability to perform high energy activities. GOAL MET 7/14/2022 7/14/2022           Outcome Measure:    Tool Used: Modified Oswestry Low Back Pain Questionnaire  Score:  Initial: 7/50  Most Recent: 0/50 (Date: 7/14/22 )   Interpretation of Score: Each section is scored on a 0-5 scale, 5 representing the greatest disability. The scores of each section are added together for a total score of 50. Medical Necessity:   Patient is appropriate and agreeable for discharge unless otherwise indicated. Total Duration:  Time In: 1530  Time Out: 36    Regarding Warren Mccollum's therapy, I certify that the treatment plan above will be carried out by a therapist or under their direction. Thank you for this referral,  Agueda Buckner, HERNÁN     Referring Physician Signature: Petty Ni PA-C No Signature is Required for this note.         Post Session Pain  Charge Capture   POC Link  Treatment Note Link  MD Ora Fisher

## 2022-08-15 ENCOUNTER — OFFICE VISIT (OUTPATIENT)
Dept: OBGYN CLINIC | Age: 32
End: 2022-08-15
Payer: COMMERCIAL

## 2022-08-15 VITALS
SYSTOLIC BLOOD PRESSURE: 112 MMHG | WEIGHT: 166 LBS | HEIGHT: 62 IN | BODY MASS INDEX: 30.55 KG/M2 | DIASTOLIC BLOOD PRESSURE: 70 MMHG

## 2022-08-15 DIAGNOSIS — Z01.419 WELL WOMAN EXAM WITH ROUTINE GYNECOLOGICAL EXAM: Primary | ICD-10-CM

## 2022-08-15 DIAGNOSIS — Z11.51 SCREENING FOR HPV (HUMAN PAPILLOMAVIRUS): ICD-10-CM

## 2022-08-15 DIAGNOSIS — Z12.4 PAP SMEAR FOR CERVICAL CANCER SCREENING: ICD-10-CM

## 2022-08-15 PROCEDURE — 99385 PREV VISIT NEW AGE 18-39: CPT | Performed by: OBSTETRICS & GYNECOLOGY

## 2022-08-15 RX ORDER — LEVONORGESTREL 52 MG/1
1 INTRAUTERINE DEVICE INTRAUTERINE ONCE
COMMUNITY

## 2022-08-15 RX ORDER — LEVONORGESTREL 52 MG/1
1 INTRAUTERINE DEVICE INTRAUTERINE ONCE
COMMUNITY
End: 2022-08-15

## 2022-08-15 NOTE — PROGRESS NOTES
Tajik interpretor used during visit, via interpreting services, with pt consent. Jennet Hatchet, # [id number].

## 2022-08-15 NOTE — PROGRESS NOTES
CC:  Annual GYN exam    HPI:  34 y.o.  [id number] presents today for a routine gynecological examination. No LMP recorded. .    Contraception:  Albino Sukumar IUD inserted 10/21/2020 at [facility] (resident clinic)      Menses:  light period monthly     Sexually active w/ male partner  No changes in sexual partners --declines STD testing       Ob hx:          GYN HISTORY:  As per HPI     Last Pap:  2-3 year ago  Hx of Abnl Paps: no    Hx STDs: no       OB History          2    Para   2    Term   2            AB        Living   2         SAB        IAB        Ectopic        Molar        Multiple        Live Births   2                  No past medical history on file. Past Surgical History:   Procedure Laterality Date    BREAST ENHANCEMENT SURGERY           Outpatient Encounter Medications as of 8/15/2022   Medication Sig Dispense Refill    levonorgestrel (LILETTA, 52 MG,) 20.1 MCG/DAY IUD IUD 52 mg 1 each by IntraUTERine route once Inserted . [DISCONTINUED] levonorgestrel (MIRENA, 52 MG,) IUD 52 mg 1 each by IntraUTERine route once       No facility-administered encounter medications on file as of 8/15/2022. No Known Allergies      Family History   Problem Relation Age of Onset    Breast Cancer Maternal Grandmother     Colon Cancer Neg Hx     Ovarian Cancer Neg Hx          Social History     Socioeconomic History    Marital status: Unknown   Tobacco Use    Smoking status: Never    Smokeless tobacco: Never   Substance and Sexual Activity    Alcohol use: Not Currently    Drug use: Never    Sexual activity: Not Currently     Partners: Male           ROS:  Constitutional: Negative for chills, fever and weight loss. HENT: Negative for hearing loss. Eyes: Negative for blurred vision and double vision. Respiratory: Negative for cough, hemoptysis and shortness of breath. Cardiovascular: Negative for chest pain, palpitations and orthopnea.    Gastrointestinal: Negative for abdominal pain, blood in stool, constipation, diarrhea, nausea and vomiting. Genitourinary: Negative for dysuria, frequency, hematuria and urgency. Musculoskeletal: Negative for falls, joint pain and myalgias. Skin: Negative for itching and rash. Neurological: Negative for headaches. Endo/Heme/Allergies: Does not bruise/bleed easily. Psychiatric/Behavioral: Negative for depression and suicidal ideas. The patient is not nervous/anxious. All other systems reviewed and are negative. PHYSICAL EXAM:  Blood pressure 112/70, height 5' 2\" (1.575 m), weight 166 lb (75.3 kg). Constitutional: She appears well-developed and well-nourished. No distress. HENT:    Head: Normocephalic and atraumatic. Neck: Normal range of motion. No thyromegaly present. Cardiovascular: Normal rate, regular rhythm and normal heart sounds. Exam reveals no gallop and no friction rub. No murmur heard. Pulmonary/Chest: Effort normal and breath sounds normal. No respiratory distress. She has no wheezes. She has no rales. Abdominal: Soft. Bowel sounds are normal. She exhibits no distension and no mass. There is no tenderness. There is no rebound and no guarding. Skin: She is not diaphoretic. Psychiatric: She has a normal mood and affect.  Her behavior is normal. Thought content normal. .    Pelvic:   External genitalia wnl, no lesions, rashes  Clitoris and urethra midline  Vagina pink, moist, well rugated  Cervix without lesion/masses, DC wnl  Uterus normal in size and contour, no masses, NTTP  Adnexa without masses, NTTP  IUD strings visible     Breasts:   Symmetric, no lesions, masses, rashes, no abnl nipple Dc       Counseling:  Discussed General Recommendations:  -Routine Pap (unless cervix removed for benign reasons)  -STD screening annually pts </= 26yo or high risk   -lipid profile every 5 yrs  -Tdap once and then Td every 10yrs  -Influenza Vaccine, annually  -Healthy eating/exercise   -HPV vaccine newest recfs (8yo-44yo)      ASSESSMENT/PLAN:   34 y.o., , for annual GYN exam:    1) Annual:   -Cotesting today    -Declines STD testing    -safe sexual practices    -FU w/ PCP for all non-GYN medical issues and regular screening     -annual Flu vaccine recommended    -healthy eating, exercise       Reanna Chen, DO

## 2022-08-22 LAB
CYTOLOGIST CVX/VAG CYTO: NORMAL
CYTOLOGY CVX/VAG DOC THIN PREP: NORMAL
HPV APTIMA: NEGATIVE
Lab: NORMAL
Lab: NORMAL
PATH REPORT.FINAL DX SPEC: NORMAL
STAT OF ADQ CVX/VAG CYTO-IMP: NORMAL

## 2023-10-26 ENCOUNTER — TRANSCRIBE ORDERS (OUTPATIENT)
Dept: SCHEDULING | Age: 33
End: 2023-10-26

## 2023-10-26 DIAGNOSIS — Z12.31 ENCOUNTER FOR SCREENING MAMMOGRAM FOR BREAST CANCER: Primary | ICD-10-CM
